# Patient Record
Sex: MALE | Race: BLACK OR AFRICAN AMERICAN | NOT HISPANIC OR LATINO | URBAN - METROPOLITAN AREA
[De-identification: names, ages, dates, MRNs, and addresses within clinical notes are randomized per-mention and may not be internally consistent; named-entity substitution may affect disease eponyms.]

---

## 2017-01-08 ENCOUNTER — EMERGENCY (EMERGENCY)
Facility: HOSPITAL | Age: 57
LOS: 1 days | Discharge: HOME | End: 2017-01-08
Attending: EMERGENCY MEDICINE
Payer: MEDICARE

## 2017-01-08 ENCOUNTER — INPATIENT (INPATIENT)
Facility: HOSPITAL | Age: 57
LOS: 3 days | Discharge: HOME | DRG: 312 | End: 2017-01-12
Attending: INTERNAL MEDICINE | Admitting: FAMILY MEDICINE
Payer: MEDICARE

## 2017-01-08 VITALS
SYSTOLIC BLOOD PRESSURE: 129 MMHG | HEART RATE: 81 BPM | RESPIRATION RATE: 18 BRPM | TEMPERATURE: 98 F | DIASTOLIC BLOOD PRESSURE: 72 MMHG | OXYGEN SATURATION: 98 %

## 2017-01-08 VITALS
HEIGHT: 70 IN | TEMPERATURE: 97 F | SYSTOLIC BLOOD PRESSURE: 142 MMHG | RESPIRATION RATE: 16 BRPM | DIASTOLIC BLOOD PRESSURE: 70 MMHG | OXYGEN SATURATION: 97 % | HEART RATE: 73 BPM | WEIGHT: 147.05 LBS

## 2017-01-08 VITALS
DIASTOLIC BLOOD PRESSURE: 62 MMHG | HEART RATE: 74 BPM | SYSTOLIC BLOOD PRESSURE: 97 MMHG | OXYGEN SATURATION: 96 % | RESPIRATION RATE: 20 BRPM | TEMPERATURE: 99 F

## 2017-01-08 PROCEDURE — 93010 ELECTROCARDIOGRAM REPORT: CPT

## 2017-01-08 PROCEDURE — 72100 X-RAY EXAM L-S SPINE 2/3 VWS: CPT | Mod: 26

## 2017-01-08 PROCEDURE — 73562 X-RAY EXAM OF KNEE 3: CPT | Mod: 26,RT

## 2017-01-08 PROCEDURE — 72170 X-RAY EXAM OF PELVIS: CPT | Mod: 26

## 2017-01-08 PROCEDURE — 99284 EMERGENCY DEPT VISIT MOD MDM: CPT

## 2017-01-08 RX ORDER — IBUPROFEN 200 MG
600 TABLET ORAL ONCE
Qty: 0 | Refills: 0 | Status: COMPLETED | OUTPATIENT
Start: 2017-01-08 | End: 2017-01-08

## 2017-01-08 RX ORDER — MORPHINE SULFATE 50 MG/1
4 CAPSULE, EXTENDED RELEASE ORAL ONCE
Qty: 0 | Refills: 0 | Status: DISCONTINUED | OUTPATIENT
Start: 2017-01-08 | End: 2017-01-08

## 2017-01-08 RX ADMIN — Medication 600 MILLIGRAM(S): at 16:15

## 2017-01-08 RX ADMIN — MORPHINE SULFATE 4 MILLIGRAM(S): 50 CAPSULE, EXTENDED RELEASE ORAL at 18:13

## 2017-01-08 RX ADMIN — Medication 600 MILLIGRAM(S): at 15:15

## 2017-01-08 RX ADMIN — MORPHINE SULFATE 4 MILLIGRAM(S): 50 CAPSULE, EXTENDED RELEASE ORAL at 21:41

## 2017-01-08 NOTE — ED ADULT NURSE NOTE - OBJECTIVE STATEMENT
pt A&OX3 into ED for c/o right knee pain and back pain s/p fall. states knees gave out and he fell back onto butt. Denies hitting head, denies LOC.

## 2017-01-08 NOTE — ED ADULT TRIAGE NOTE - CHIEF COMPLAINT QUOTE
BIBA< patient is awake and oriented times 3, complains of "my knees gave out and I fell, patient denies any head injury, denies any loc, complains of back and knee pain, last dialysis yesterday no complications

## 2017-01-08 NOTE — ED ADULT TRIAGE NOTE - CHIEF COMPLAINT QUOTE
pt biba states "feeling weak today." pt is a/3, reports he was DC'd earlier today from [facility] and felt like he was going to pass out. pt has fistula to right arm and receives dialysis on t/th/sat.
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
## 2017-01-08 NOTE — ED ADULT NURSE NOTE - PMH
Afib    Anuria    Aortic stenosis    Asthma  triggered with weather change or fluid overload  CAD (coronary artery disease)    Cirrhosis    COPD (chronic obstructive pulmonary disease)    Dialysis patient  Tuesday, Thursday, Saturday  Heart murmur    Hepatomegaly    HIV disease    Hyperlipemia    Hypertension    Hypertension    Kidney failure    Mitral regurgitation    Weight loss

## 2017-01-09 DIAGNOSIS — R55 SYNCOPE AND COLLAPSE: ICD-10-CM

## 2017-01-09 LAB
ALBUMIN SERPL ELPH-MCNC: 3.4 G/DL — SIGNIFICANT CHANGE UP (ref 3.3–5.2)
ALP SERPL-CCNC: 164 U/L — HIGH (ref 40–120)
ALT FLD-CCNC: 14 U/L — SIGNIFICANT CHANGE UP
ANION GAP SERPL CALC-SCNC: 16 MMOL/L — SIGNIFICANT CHANGE UP (ref 5–17)
ANION GAP SERPL CALC-SCNC: 19 MMOL/L — HIGH (ref 5–17)
AST SERPL-CCNC: 20 U/L — SIGNIFICANT CHANGE UP
BASOPHILS # BLD AUTO: 0 K/UL — SIGNIFICANT CHANGE UP (ref 0–0.2)
BASOPHILS NFR BLD AUTO: 0.3 % — SIGNIFICANT CHANGE UP (ref 0–2)
BILIRUB SERPL-MCNC: 0.5 MG/DL — SIGNIFICANT CHANGE UP (ref 0.4–2)
BUN SERPL-MCNC: 37 MG/DL — HIGH (ref 8–20)
BUN SERPL-MCNC: 48 MG/DL — HIGH (ref 8–20)
CALCIUM SERPL-MCNC: 9.4 MG/DL — SIGNIFICANT CHANGE UP (ref 8.6–10.2)
CALCIUM SERPL-MCNC: 9.7 MG/DL — SIGNIFICANT CHANGE UP (ref 8.6–10.2)
CHLORIDE SERPL-SCNC: 89 MMOL/L — LOW (ref 98–107)
CHLORIDE SERPL-SCNC: 92 MMOL/L — LOW (ref 98–107)
CK SERPL-CCNC: 75 U/L — SIGNIFICANT CHANGE UP (ref 30–200)
CO2 SERPL-SCNC: 26 MMOL/L — SIGNIFICANT CHANGE UP (ref 22–29)
CO2 SERPL-SCNC: 30 MMOL/L — HIGH (ref 22–29)
CREAT SERPL-MCNC: 5.42 MG/DL — HIGH (ref 0.5–1.3)
CREAT SERPL-MCNC: 6.41 MG/DL — HIGH (ref 0.5–1.3)
EOSINOPHIL # BLD AUTO: 0.4 K/UL — SIGNIFICANT CHANGE UP (ref 0–0.5)
EOSINOPHIL NFR BLD AUTO: 4 % — SIGNIFICANT CHANGE UP (ref 0–5)
GLUCOSE SERPL-MCNC: 103 MG/DL — SIGNIFICANT CHANGE UP (ref 70–115)
GLUCOSE SERPL-MCNC: 106 MG/DL — SIGNIFICANT CHANGE UP (ref 70–115)
HCT VFR BLD CALC: 44.8 % — SIGNIFICANT CHANGE UP (ref 42–52)
HGB BLD-MCNC: 14.4 G/DL — SIGNIFICANT CHANGE UP (ref 14–18)
LYMPHOCYTES # BLD AUTO: 1.6 K/UL — SIGNIFICANT CHANGE UP (ref 1–4.8)
LYMPHOCYTES # BLD AUTO: 18.6 % — LOW (ref 20–55)
MAGNESIUM SERPL-MCNC: 2.9 MG/DL — HIGH (ref 1.8–2.5)
MCHC RBC-ENTMCNC: 29.6 PG — SIGNIFICANT CHANGE UP (ref 27–31)
MCHC RBC-ENTMCNC: 32.1 G/DL — SIGNIFICANT CHANGE UP (ref 32–36)
MCV RBC AUTO: 92.2 FL — SIGNIFICANT CHANGE UP (ref 80–94)
MONOCYTES # BLD AUTO: 0.8 K/UL — SIGNIFICANT CHANGE UP (ref 0–0.8)
MONOCYTES NFR BLD AUTO: 8.7 % — SIGNIFICANT CHANGE UP (ref 3–10)
NEUTROPHILS # BLD AUTO: 6 K/UL — SIGNIFICANT CHANGE UP (ref 1.8–8)
NEUTROPHILS NFR BLD AUTO: 68.1 % — SIGNIFICANT CHANGE UP (ref 37–73)
PLATELET # BLD AUTO: 235 K/UL — SIGNIFICANT CHANGE UP (ref 150–400)
POTASSIUM SERPL-MCNC: 4.4 MMOL/L — SIGNIFICANT CHANGE UP (ref 3.5–5.3)
POTASSIUM SERPL-MCNC: 5 MMOL/L — SIGNIFICANT CHANGE UP (ref 3.5–5.3)
POTASSIUM SERPL-SCNC: 4.4 MMOL/L — SIGNIFICANT CHANGE UP (ref 3.5–5.3)
POTASSIUM SERPL-SCNC: 5 MMOL/L — SIGNIFICANT CHANGE UP (ref 3.5–5.3)
PROT SERPL-MCNC: 7.9 G/DL — SIGNIFICANT CHANGE UP (ref 6.6–8.7)
RBC # BLD: 4.86 M/UL — SIGNIFICANT CHANGE UP (ref 4.6–6.2)
RBC # FLD: 15.9 % — HIGH (ref 11–15.6)
SODIUM SERPL-SCNC: 134 MMOL/L — LOW (ref 135–145)
SODIUM SERPL-SCNC: 138 MMOL/L — SIGNIFICANT CHANGE UP (ref 135–145)
TROPONIN T SERPL-MCNC: 0.81 NG/ML — CRITICAL HIGH (ref 0–0.06)
TROPONIN T SERPL-MCNC: 0.86 NG/ML — CRITICAL HIGH (ref 0–0.06)
WBC # BLD: 8.76 K/UL — SIGNIFICANT CHANGE UP (ref 4.8–10.8)
WBC # FLD AUTO: 8.76 K/UL — SIGNIFICANT CHANGE UP (ref 4.8–10.8)

## 2017-01-09 PROCEDURE — 99285 EMERGENCY DEPT VISIT HI MDM: CPT

## 2017-01-09 PROCEDURE — 70450 CT HEAD/BRAIN W/O DYE: CPT | Mod: 26

## 2017-01-09 PROCEDURE — 99232 SBSQ HOSP IP/OBS MODERATE 35: CPT

## 2017-01-09 PROCEDURE — 71010: CPT | Mod: 26

## 2017-01-09 PROCEDURE — 93010 ELECTROCARDIOGRAM REPORT: CPT

## 2017-01-09 RX ORDER — FOLIC ACID 0.8 MG
1 TABLET ORAL DAILY
Qty: 0 | Refills: 0 | Status: DISCONTINUED | OUTPATIENT
Start: 2017-01-09 | End: 2017-01-12

## 2017-01-09 RX ORDER — SEVELAMER CARBONATE 2400 MG/1
800 POWDER, FOR SUSPENSION ORAL
Qty: 0 | Refills: 0 | Status: DISCONTINUED | OUTPATIENT
Start: 2017-01-09 | End: 2017-01-12

## 2017-01-09 RX ORDER — ALBUTEROL 90 UG/1
2.5 AEROSOL, METERED ORAL EVERY 6 HOURS
Qty: 0 | Refills: 0 | Status: DISCONTINUED | OUTPATIENT
Start: 2017-01-09 | End: 2017-01-12

## 2017-01-09 RX ORDER — DOCUSATE SODIUM 100 MG
100 CAPSULE ORAL THREE TIMES A DAY
Qty: 0 | Refills: 0 | Status: DISCONTINUED | OUTPATIENT
Start: 2017-01-09 | End: 2017-01-12

## 2017-01-09 RX ORDER — ATORVASTATIN CALCIUM 80 MG/1
10 TABLET, FILM COATED ORAL AT BEDTIME
Qty: 0 | Refills: 0 | Status: DISCONTINUED | OUTPATIENT
Start: 2017-01-09 | End: 2017-01-12

## 2017-01-09 RX ORDER — HYDROMORPHONE HYDROCHLORIDE 2 MG/ML
2 INJECTION INTRAMUSCULAR; INTRAVENOUS; SUBCUTANEOUS EVERY 4 HOURS
Qty: 0 | Refills: 0 | Status: DISCONTINUED | OUTPATIENT
Start: 2017-01-09 | End: 2017-01-12

## 2017-01-09 RX ORDER — ALBUTEROL 90 UG/1
1 AEROSOL, METERED ORAL EVERY 4 HOURS
Qty: 0 | Refills: 0 | Status: DISCONTINUED | OUTPATIENT
Start: 2017-01-09 | End: 2017-01-12

## 2017-01-09 RX ORDER — BUDESONIDE AND FORMOTEROL FUMARATE DIHYDRATE 160; 4.5 UG/1; UG/1
2 AEROSOL RESPIRATORY (INHALATION)
Qty: 0 | Refills: 0 | Status: DISCONTINUED | OUTPATIENT
Start: 2017-01-09 | End: 2017-01-12

## 2017-01-09 RX ORDER — METOPROLOL TARTRATE 50 MG
100 TABLET ORAL
Qty: 0 | Refills: 0 | Status: DISCONTINUED | OUTPATIENT
Start: 2017-01-09 | End: 2017-01-12

## 2017-01-09 RX ORDER — POLYETHYLENE GLYCOL 3350 17 G/17G
17 POWDER, FOR SOLUTION ORAL DAILY
Qty: 0 | Refills: 0 | Status: DISCONTINUED | OUTPATIENT
Start: 2017-01-09 | End: 2017-01-12

## 2017-01-09 RX ORDER — OXYCODONE HYDROCHLORIDE 5 MG/1
5 TABLET ORAL EVERY 4 HOURS
Qty: 0 | Refills: 0 | Status: DISCONTINUED | OUTPATIENT
Start: 2017-01-09 | End: 2017-01-12

## 2017-01-09 RX ORDER — HEPARIN SODIUM 5000 [USP'U]/ML
7500 INJECTION INTRAVENOUS; SUBCUTANEOUS EVERY 8 HOURS
Qty: 0 | Refills: 0 | Status: DISCONTINUED | OUTPATIENT
Start: 2017-01-09 | End: 2017-01-12

## 2017-01-09 RX ORDER — CLOPIDOGREL BISULFATE 75 MG/1
75 TABLET, FILM COATED ORAL DAILY
Qty: 0 | Refills: 0 | Status: DISCONTINUED | OUTPATIENT
Start: 2017-01-09 | End: 2017-01-12

## 2017-01-09 RX ORDER — ASPIRIN/CALCIUM CARB/MAGNESIUM 324 MG
81 TABLET ORAL DAILY
Qty: 0 | Refills: 0 | Status: DISCONTINUED | OUTPATIENT
Start: 2017-01-09 | End: 2017-01-12

## 2017-01-09 RX ORDER — NIFEDIPINE 30 MG
90 TABLET, EXTENDED RELEASE 24 HR ORAL DAILY
Qty: 0 | Refills: 0 | Status: DISCONTINUED | OUTPATIENT
Start: 2017-01-09 | End: 2017-01-09

## 2017-01-09 RX ADMIN — Medication 100 MILLIGRAM(S): at 18:05

## 2017-01-09 RX ADMIN — Medication 81 MILLIGRAM(S): at 12:05

## 2017-01-09 RX ADMIN — OXYCODONE HYDROCHLORIDE 5 MILLIGRAM(S): 5 TABLET ORAL at 13:10

## 2017-01-09 RX ADMIN — ATORVASTATIN CALCIUM 10 MILLIGRAM(S): 80 TABLET, FILM COATED ORAL at 22:05

## 2017-01-09 RX ADMIN — ALBUTEROL 2.5 MILLIGRAM(S): 90 AEROSOL, METERED ORAL at 21:05

## 2017-01-09 RX ADMIN — HEPARIN SODIUM 7500 UNIT(S): 5000 INJECTION INTRAVENOUS; SUBCUTANEOUS at 18:05

## 2017-01-09 RX ADMIN — SEVELAMER CARBONATE 800 MILLIGRAM(S): 2400 POWDER, FOR SUSPENSION ORAL at 18:20

## 2017-01-09 RX ADMIN — CLOPIDOGREL BISULFATE 75 MILLIGRAM(S): 75 TABLET, FILM COATED ORAL at 12:05

## 2017-01-09 RX ADMIN — HYDROMORPHONE HYDROCHLORIDE 2 MILLIGRAM(S): 2 INJECTION INTRAMUSCULAR; INTRAVENOUS; SUBCUTANEOUS at 18:21

## 2017-01-09 RX ADMIN — SEVELAMER CARBONATE 800 MILLIGRAM(S): 2400 POWDER, FOR SUSPENSION ORAL at 12:35

## 2017-01-09 RX ADMIN — POLYETHYLENE GLYCOL 3350 17 GRAM(S): 17 POWDER, FOR SOLUTION ORAL at 18:06

## 2017-01-09 RX ADMIN — ALBUTEROL 2.5 MILLIGRAM(S): 90 AEROSOL, METERED ORAL at 15:14

## 2017-01-09 RX ADMIN — Medication 100 MILLIGRAM(S): at 18:06

## 2017-01-09 RX ADMIN — Medication 100 MILLIGRAM(S): at 22:05

## 2017-01-09 RX ADMIN — OXYCODONE HYDROCHLORIDE 5 MILLIGRAM(S): 5 TABLET ORAL at 12:05

## 2017-01-09 RX ADMIN — Medication 90 MILLIGRAM(S): at 12:35

## 2017-01-09 RX ADMIN — HYDROMORPHONE HYDROCHLORIDE 2 MILLIGRAM(S): 2 INJECTION INTRAMUSCULAR; INTRAVENOUS; SUBCUTANEOUS at 19:19

## 2017-01-09 RX ADMIN — HEPARIN SODIUM 7500 UNIT(S): 5000 INJECTION INTRAVENOUS; SUBCUTANEOUS at 22:05

## 2017-01-09 RX ADMIN — Medication 1 MILLIGRAM(S): at 12:05
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
## 2017-01-09 NOTE — ED ADULT NURSE NOTE - OBJECTIVE STATEMENT
Pt family states pt "was sitting on the edge of the bed, complained of nausea, fell backward on bed, eyes rolled to back of head and he was unresponsive for about a minute, upon arousal with sternal rub pt vomited", family denies apnea. Pt was in hospital yesterday for a fall caused by weakness resulting in right knee pain.

## 2017-01-09 NOTE — H&P ADULT. - FAMILY HISTORY
<<-----Click on this checkbox to enter Family History Family history of diabetes mellitus     Father  Still living? Unknown  Family history of coronary arteriosclerosis, Age at diagnosis: Age Unknown
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
## 2017-01-09 NOTE — H&P ADULT. - ASSESSMENT
55 y/o male with a hx of afib, Anuria, Aortic stenosis, Asthma, CAD, Cirrhosis, COPD, ESRD on Dialysis (T/T/S), HIV, HDL and HTN. Presented to ED status post syncopal episode. On EMS arrival vitals were: HR 66-78, BP /56-74, RR 18. And pt was c/o generalized weakness. found to have elevated Troponin without chest pain.   Syncope, suspect bradyarrhythmias   - Admit to telemetry unit  - Consulted [facility] svc notified  - f/u CE x2, first set elevated, questionable chronically elevated on review of prior labs  - OOB with assistance, fall and aspiration precautions  - f/u Carotid doppler and repeat ECHO for monitoring of AS, mod-severe mitral annular calcification  - resume home medications for HIV, ESRD, HTN, HLD, Asthma, CAD and COPD  - consulted Nephrology for continuation of hemodialysis   - DVT prophylaxis

## 2017-01-09 NOTE — ED PROVIDER NOTE - OBJECTIVE STATEMENT
57 y/o male with a hx of 55 y/o male with a hx of afib, Anuria, Aortic stenosis, Asthma, CAD, Cirrhosis, COPD, Dialysis, Heart murmur, HIV, HDL, HTN, renal failure, and back surgery presents to the ED c/o weakness that onset today. pt was here earlier s/p fall and hitting his knee. Daughter at bedside notes that pt was at rest, exerted himself little and pt's eye rolled back and pt started to become unresponsive. Sx lasted about 1 minute. Daughter called 911 who sent pt here. Denies numbness, tingling, fever, change in vision/speech/gait, or vomiting. No further complaints at this time.

## 2017-01-09 NOTE — CONSULT NOTE ADULT - ASSESSMENT
ESRD on HD TTS tolerating ok will HD again hunter  is admitted for near syncope workup  Consent obtained.  BP volume status electrolytes acceptible

## 2017-01-09 NOTE — ED PROVIDER NOTE - CRITICAL CARE PROVIDED
documentation/consultation with other physicians/direct patient care (not related to procedure)/consult w/ pt's family directly relating to pts condition/interpretation of diagnostic studies

## 2017-01-09 NOTE — ED ADULT NURSE NOTE - CHIEF COMPLAINT QUOTE
pt biba states "feeling weak today." pt is a/3, reports he was DC'd earlier today from [facility] and felt like he was going to pass out. pt has fistula to right arm and receives dialysis on t/th/sat.

## 2017-01-09 NOTE — CONSULT NOTE ADULT - ASSESSMENT
ESRD on HD on a TTS schedule   Has been tolerating ok next HSD [facility]  Consent obtained in ED  admitted for syncope workup

## 2017-01-09 NOTE — H&P ADULT. - HISTORY OF PRESENT ILLNESS
This is a 57 y/o male with a hx of atrial fibrillation (not on AC), Anuria, Aortic stenosis, Asthma, CAD, Cirrhosis, COPD, ESRD on Dialysis (T/T/S), HIV, Hepatitis C, HDL, HTN and back surgery presents to the ED stating that while sitting on his bed he "blacked out" upon awakening he felt dizziness with lasted until he arrived to the ER. pt was here earlier s/p fall and hitting his knee. Currently no family at bedside, but as per documentation in ER his daughter indicated that patient had exerted himself a little, eye's rolled back and he became unresponsive for one minute. patient denies any chest pain, palpitations, nausea, vomiting, diarrhea, headache, visual changes.     Multiple similar episodes prior secondary to hypotension.

## 2017-01-09 NOTE — PROGRESS NOTE ADULT - SUBJECTIVE AND OBJECTIVE BOX
ROSALVA RODRIGUES is a 56y Male with HPI:  This is a 57 y/o male with a hx of atrial fibrillation (not on AC), Anuria, Aortic stenosis, Asthma, CAD, Cirrhosis, COPD, ESRD on Dialysis (T/T/S), HIV, Hepatitis C, HDL, HTN and back surgery presents to the ED stating that while sitting on his bed he "blacked out" upon awakening he felt dizziness with lasted until he arrived to the ER. pt was here earlier s/p fall and hitting his knee.)        Allergies:  No Known Drug Allergies  shellfish (Hives)      Medications:  aspirin enteric coated 81milliGRAM(s) Oral daily  oxyCODONE IR 5milliGRAM(s) Oral every 4 hours PRN  atorvastatin 10milliGRAM(s) Oral at bedtime  clopidogrel Tablet 75milliGRAM(s) Oral daily  zidovudine 300 mG/lamiVUDine 150 mG 1Tablet(s) Oral two times a day  metoprolol 100milliGRAM(s) Oral two times a day  ALBUTerol    0.083% 2.5milliGRAM(s) Nebulizer every 6 hours  ALBUTerol    90 MICROgram(s) HFA Inhaler 1Puff(s) Inhalation every 4 hours  buDESOnide 160 MICROgram(s)/formoterol 4.5 MICROgram(s) Inhaler 2Puff(s) Inhalation two times a day  NIFEdipine XL 90milliGRAM(s) Oral daily  docusate sodium 100milliGRAM(s) Oral three times a day  polyethylene glycol 3350 17Gram(s) Oral daily  sevelamer hydrochloride 800milliGRAM(s) Oral three times a day with meals  folic acid 1milliGRAM(s) Oral daily  heparin  Injectable 7500Unit(s) SubCutaneous every 8 hours      ANTIBIOTICS:         Review of Systems: - Negative except as mentioned above     Physical Exam:  ICU Vital Signs Last 24 Hrs  T(C): 37.4, Max: 37.4 (01-09 @ 06:15)  T(F): 99.4, Max: 99.4 (01-09 @ 06:15)  HR: 83 (74 - 86)  BP: 122/56 (97/62 - 124/59)  BP(mean): --  ABP: --  ABP(mean): --  RR: 20 (20 - 20)  SpO2: 98% (96% - 99%)    GEN: NAD, pleasant  HEENT: normocephalic and atraumatic. EOMI. PRESTON...  NECK: Supple. No carotid bruits.  No lymphadenopathy or thyromegaly.  LUNGS: Clear to auscultation.  HEART: Regular rate and rhythm without murmur.  ABDOMEN: Soft, nontender, and nondistended.  Positive bowel sounds.  No hepatosplenomegaly was noted.  NO REBOUND NO GUARDING  EXTREMITIES: Without any cyanosis, clubbing, rash, lesions or edema.  NEUROLOGIC: Cranial nerves II through XII are grossly intact.    SKIN: No ulceration or induration present.      Labs:  09 Jan 2017 01:23    138    |  92     |  37.0   ----------------------------<  103    4.4     |  30.0   |  5.42     Ca    9.7        09 Jan 2017 01:23    TPro  7.9    /  Alb  3.4    /  TBili  0.5    /  DBili  x      /  AST  20     /  ALT  14     /  AlkPhos  164    09 Jan 2017 01:23                          14.4   8.76  )-----------( 235      ( 09 Jan 2017 01:23 )             44.8           LIVER FUNCTIONS - ( 09 Jan 2017 01:23 )  Alb: 3.4 g/dL / Pro: 7.9 g/dL / ALK PHOS: 164 U/L / ALT: 14 U/L / AST: 20 U/L / GGT: x           CARDIAC MARKERS ( 09 Jan 2017 07:40 )  x     / 0.86 ng/mL / 75 U/L / x     / x      CARDIAC MARKERS ( 09 Jan 2017 01:23 )  x     / 0.81 ng/mL / x     / x     / x          CAPILLARY BLOOD GLUCOSE        RECENT CULTURES:

## 2017-01-09 NOTE — CONSULT NOTE ADULT - ASSESSMENT
56y Male with prior history of CAD with PCI in 2012, old MI in 2012, ESRD on HD, AF not on anticoagulation, HTN 56y Male with prior history of CAD with PCI in 2012, old MI in 2012, ESRD on HD, AF not on anticoagulation, HTN s/p fall/syncope. Recent fall with fractured ribs in May 2016.     1. No events on tele so far but pt with IVCD and AF with 2 falls/syncope.  2. Minimal troponin elevation likely secondary to ESRD and CAD. Recent PET without ischemia  3. EP evaluation for syncope/fall for ILR/PPM   4. HD as per renal.  5. Continue telemetry monitoring.

## 2017-01-09 NOTE — H&P ADULT. - RS GEN PE MLT RESP DETAILS PC
respirations non-labored/clear to auscultation bilaterally/no wheezes/diminished breath sounds, L/no intercostal retractions/no rhonchi/no subcutaneous emphysema/no rales/no chest wall tenderness/diminished breath sounds, R

## 2017-01-09 NOTE — ED PROVIDER NOTE - NS ED MD SCRIBE ATTENDING SCRIBE SECTIONS
REVIEW OF SYSTEMS/HIV/RESULTS/VITAL SIGNS( Pullset)/PHYSICAL EXAM/DISPOSITION/PROGRESS NOTE/HISTORY OF PRESENT ILLNESS/PAST MEDICAL/SURGICAL/SOCIAL HISTORY/CONSULTATIONS/SHIFT CHANGE
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
## 2017-01-09 NOTE — H&P ADULT. - GASTROINTESTINAL DETAILS
no guarding/nontender/no bruit/no rebound tenderness/no organomegaly/soft/no distention/normal/bowel sounds normal/no rigidity/no masses palpable

## 2017-01-10 PROCEDURE — 93308 TTE F-UP OR LMTD: CPT | Mod: 26,59

## 2017-01-10 PROCEDURE — 93306 TTE W/DOPPLER COMPLETE: CPT | Mod: 26

## 2017-01-10 PROCEDURE — 93880 EXTRACRANIAL BILAT STUDY: CPT | Mod: 26

## 2017-01-10 PROCEDURE — 99232 SBSQ HOSP IP/OBS MODERATE 35: CPT

## 2017-01-10 RX ORDER — SENNA PLUS 8.6 MG/1
2 TABLET ORAL AT BEDTIME
Qty: 0 | Refills: 0 | Status: DISCONTINUED | OUTPATIENT
Start: 2017-01-10 | End: 2017-01-12

## 2017-01-10 RX ORDER — IPRATROPIUM/ALBUTEROL SULFATE 18-103MCG
3 AEROSOL WITH ADAPTER (GRAM) INHALATION ONCE
Qty: 0 | Refills: 0 | Status: COMPLETED | OUTPATIENT
Start: 2017-01-10 | End: 2017-01-10

## 2017-01-10 RX ORDER — CEPHALEXIN 500 MG
500 CAPSULE ORAL ONCE
Qty: 0 | Refills: 0 | Status: COMPLETED | OUTPATIENT
Start: 2017-01-10 | End: 2017-01-10

## 2017-01-10 RX ADMIN — ALBUTEROL 2.5 MILLIGRAM(S): 90 AEROSOL, METERED ORAL at 02:54

## 2017-01-10 RX ADMIN — ALBUTEROL 2.5 MILLIGRAM(S): 90 AEROSOL, METERED ORAL at 14:32

## 2017-01-10 RX ADMIN — Medication 81 MILLIGRAM(S): at 12:44

## 2017-01-10 RX ADMIN — HYDROMORPHONE HYDROCHLORIDE 2 MILLIGRAM(S): 2 INJECTION INTRAMUSCULAR; INTRAVENOUS; SUBCUTANEOUS at 08:44

## 2017-01-10 RX ADMIN — SEVELAMER CARBONATE 800 MILLIGRAM(S): 2400 POWDER, FOR SUSPENSION ORAL at 08:38

## 2017-01-10 RX ADMIN — Medication 100 MILLIGRAM(S): at 05:14

## 2017-01-10 RX ADMIN — BUDESONIDE AND FORMOTEROL FUMARATE DIHYDRATE 2 PUFF(S): 160; 4.5 AEROSOL RESPIRATORY (INHALATION) at 20:16

## 2017-01-10 RX ADMIN — Medication 100 MILLIGRAM(S): at 19:00

## 2017-01-10 RX ADMIN — Medication 1 MILLIGRAM(S): at 12:45

## 2017-01-10 RX ADMIN — SEVELAMER CARBONATE 800 MILLIGRAM(S): 2400 POWDER, FOR SUSPENSION ORAL at 12:44

## 2017-01-10 RX ADMIN — HYDROMORPHONE HYDROCHLORIDE 2 MILLIGRAM(S): 2 INJECTION INTRAMUSCULAR; INTRAVENOUS; SUBCUTANEOUS at 23:25

## 2017-01-10 RX ADMIN — HYDROMORPHONE HYDROCHLORIDE 2 MILLIGRAM(S): 2 INJECTION INTRAMUSCULAR; INTRAVENOUS; SUBCUTANEOUS at 04:09

## 2017-01-10 RX ADMIN — Medication 3 MILLILITER(S): at 17:20

## 2017-01-10 RX ADMIN — Medication 100 MILLIGRAM(S): at 13:28

## 2017-01-10 RX ADMIN — HEPARIN SODIUM 7500 UNIT(S): 5000 INJECTION INTRAVENOUS; SUBCUTANEOUS at 13:28

## 2017-01-10 RX ADMIN — HYDROMORPHONE HYDROCHLORIDE 2 MILLIGRAM(S): 2 INJECTION INTRAMUSCULAR; INTRAVENOUS; SUBCUTANEOUS at 05:20

## 2017-01-10 RX ADMIN — HYDROMORPHONE HYDROCHLORIDE 2 MILLIGRAM(S): 2 INJECTION INTRAMUSCULAR; INTRAVENOUS; SUBCUTANEOUS at 09:38

## 2017-01-10 RX ADMIN — CLOPIDOGREL BISULFATE 75 MILLIGRAM(S): 75 TABLET, FILM COATED ORAL at 12:44

## 2017-01-10 RX ADMIN — ALBUTEROL 2.5 MILLIGRAM(S): 90 AEROSOL, METERED ORAL at 08:03

## 2017-01-10 RX ADMIN — POLYETHYLENE GLYCOL 3350 17 GRAM(S): 17 POWDER, FOR SOLUTION ORAL at 12:45

## 2017-01-10 RX ADMIN — BUDESONIDE AND FORMOTEROL FUMARATE DIHYDRATE 2 PUFF(S): 160; 4.5 AEROSOL RESPIRATORY (INHALATION) at 08:41

## 2017-01-10 RX ADMIN — SEVELAMER CARBONATE 800 MILLIGRAM(S): 2400 POWDER, FOR SUSPENSION ORAL at 19:00

## 2017-01-10 RX ADMIN — HYDROMORPHONE HYDROCHLORIDE 2 MILLIGRAM(S): 2 INJECTION INTRAMUSCULAR; INTRAVENOUS; SUBCUTANEOUS at 22:23

## 2017-01-10 RX ADMIN — Medication 500 MILLIGRAM(S): at 17:05

## 2017-01-10 RX ADMIN — ALBUTEROL 2.5 MILLIGRAM(S): 90 AEROSOL, METERED ORAL at 20:16

## 2017-01-10 RX ADMIN — HEPARIN SODIUM 7500 UNIT(S): 5000 INJECTION INTRAVENOUS; SUBCUTANEOUS at 05:14

## 2017-01-10 NOTE — PROGRESS NOTE ADULT - SUBJECTIVE AND OBJECTIVE BOX
Chief Complaint:  Cough, pink frothy sputum      Assessment:  Denies fever, chills, n/v/d chest pain, sob,  + constipation and feeling full,  Denies BM today, last BM 1 day ago.  A and o x 3, pleasant and cooperative, making needs known, skin warm and dry, bilateral fine crackles mid lobes, negative egophony, no edema, bedside cup with frothy pink sputum noted, abd soft, slight distended, bs + 4.      Plan:  CHF:  dialysis pending tonight, continue current plan of care.   Stat neb tx given with some relief.   Robitussin prn  Constipation: add senna, continue colace and Miralax

## 2017-01-10 NOTE — PROGRESS NOTE ADULT - SUBJECTIVE AND OBJECTIVE BOX
ROSALVA RODRIGUES is a 56y Male with HPI:  This is a 57 y/o male with a hx of atrial fibrillation (not on AC), Anuria, Aortic stenosis, Asthma, CAD, Cirrhosis, COPD, ESRD on Dialysis (T/T/S), HIV, Hepatitis C, HDL, HTN and back surgery presents to the ED stating that while sitting on his bed he "blacked out" upon awakening he felt dizziness with lasted until he arrived to the ER. pt was here earlier s/p fall and hitting his knee. Currently no family at bedside, but as per documentation in ER his daughter indicated that patient had exerted himself a little, eye's rolled back and he became unresponsive for one minute. patient denies any chest pain, palpitations, nausea, vomiting, diarrhea, headache, visual changes.   UNCLEAR ETIOLOGY POSSIBLE VASO VAGAL EP NOTE APPRECIATED FOR LOOP RECORDER  PT WITH  PRONOUNCED TREMORS TODAY    Multiple similar episodes prior secondary to hypotension. (09 Jan 2017 06:46)        Allergies:  No Known Drug Allergies  shellfish (Hives)      Medications:  aspirin enteric coated 81milliGRAM(s) Oral daily  oxyCODONE IR 5milliGRAM(s) Oral every 4 hours PRN  atorvastatin 10milliGRAM(s) Oral at bedtime  clopidogrel Tablet 75milliGRAM(s) Oral daily  zidovudine 300 mG/lamiVUDine 150 mG 1Tablet(s) Oral two times a day  metoprolol 100milliGRAM(s) Oral two times a day  ALBUTerol    0.083% 2.5milliGRAM(s) Nebulizer every 6 hours  ALBUTerol    90 MICROgram(s) HFA Inhaler 1Puff(s) Inhalation every 4 hours  buDESOnide 160 MICROgram(s)/formoterol 4.5 MICROgram(s) Inhaler 2Puff(s) Inhalation two times a day  docusate sodium 100milliGRAM(s) Oral three times a day  polyethylene glycol 3350 17Gram(s) Oral daily  sevelamer hydrochloride 800milliGRAM(s) Oral three times a day with meals  folic acid 1milliGRAM(s) Oral daily  heparin  Injectable 7500Unit(s) SubCutaneous every 8 hours  HYDROmorphone   Tablet 2milliGRAM(s) Oral every 4 hours PRN      ANTIBIOTICS:NONE         Review of Systems: - Negative except as mentioned above     Physical Exam:  ICU Vital Signs Last 24 Hrs  T(C): 36.6, Max: 37.2 (01-09 @ 11:45)  T(F): 97.9, Max: 98.9 (01-09 @ 11:45)  HR: 106 (85 - 106)  BP: 128/60 (128/60 - 152/82)  BP(mean): --  ABP: --  ABP(mean): --  RR: 18 (18 - 19)  SpO2: 99% (95% - 99%)    GEN: NAD, pleasant  HEENT: normocephalic and atraumatic. EOMI. PRESTON...  NECK: Supple. No carotid bruits.  No lymphadenopathy or thyromegaly.  LUNGS: Clear to auscultation.  HEART: Regular rate and rhythm without murmur.  ABDOMEN: Soft, nontender, and nondistended.  Positive bowel sounds.  No hepatosplenomegaly was noted.  NO REBOUND NO GUARDING  EXTREMITIES: Without any cyanosis, clubbing, rash, lesions or edema.  NEUROLOGIC: Cranial nerves II through XII are grossly intact.INVOLUNTARY TREMORS AT REST    SKIN: No ulceration or induration present.      Labs:  09 Jan 2017 23:21    134    |  89     |  48.0   ----------------------------<  106    5.0     |  26.0   |  6.41     Ca    9.4        09 Jan 2017 23:21  Mg     2.9       09 Jan 2017 23:21    TPro  7.9    /  Alb  3.4    /  TBili  0.5    /  DBili  x      /  AST  20     /  ALT  14     /  AlkPhos  164    09 Jan 2017 01:23                          14.4   8.76  )-----------( 235      ( 09 Jan 2017 01:23 )             44.8           LIVER FUNCTIONS - ( 09 Jan 2017 01:23 )  Alb: 3.4 g/dL / Pro: 7.9 g/dL / ALK PHOS: 164 U/L / ALT: 14 U/L / AST: 20 U/L / GGT: x           CARDIAC MARKERS ( 09 Jan 2017 07:40 )  x     / 0.86 ng/mL / 75 U/L / x     / x      CARDIAC MARKERS ( 09 Jan 2017 01:23 )  x     / 0.81 ng/mL / x     / x     / x          CAPILLARY BLOOD GLUCOSE        RECENT CULTURES:

## 2017-01-10 NOTE — PROGRESS NOTE ADULT - SUBJECTIVE AND OBJECTIVE BOX
Chief Complaint:  Coughing up pink tinged sputum and feeling full      Assessment:  Denies fever, chills, n/v/d chest pain, sob, abd pain, leg pain, swelling, or headaches.  + for constipation x 24 hours and feeling full.  Also + for cough with yellow sputum and frothy pink serosanguinous fluid.    Patient is A and O x 3, skin warm and dry, affect appropriate pleasant, tang x 4, ambulatory, bilateral fine crackles at bases, rr 16 regular for rate and rhythm color pink, abd soft, bs + 4, slight distended, no edema     Plan: constipation  Add senna, continue colace and mirlax, with no BM notify MD  Cough/CHF, pulmonary edema, dialysis's pending tonight-confimed with , Robitussin and stat tx given with slight relief. Discussed with primary and message left with Nephrology and dialysis.  Continue pulse oxy and continue to monitor and notify MD with de sating or increased coughing with pink and frothy sputum.

## 2017-01-10 NOTE — CONSULT NOTE ADULT - ASSESSMENT
The patient is a 56y Male with multiple medical issues now status post syncope.   He has some tremor which may be metabolic.     I will add EEG to his work up.    We will continue to follow.
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
## 2017-01-10 NOTE — PROGRESS NOTE ADULT - SUBJECTIVE AND OBJECTIVE BOX
Pt s/p uncomplicated loop recorder implantation. See dictated report for details.  Remove opsite dressing in AM, leave steristrips intact.  OK to shower tomorrow.  Will arrange outpt wound check 7-10 days at [facility].     Shaun Langley MD

## 2017-01-10 NOTE — PROGRESS NOTE ADULT - SUBJECTIVE AND OBJECTIVE BOX
NEPHROLOGY INTERVAL HPI/OVERNIGHT EVENTS:    Examined earlier   Looks comfortable   No complaints    MEDICATIONS  (STANDING):    MEDICATIONS  (PRN):      Allergies    No Known Drug Allergies  shellfish (Hives)    Intolerances        Vital Signs Last 24 Hrs  T(C): --  T(F): --  HR: --  BP: --  BP(mean): --  RR: --  SpO2: --  Daily     Daily     PHYSICAL EXAM:  GENERAL: appears chronically ill  HEAD:  Atraumatic, Normocephalic  EYES: EOMI  NECK: Supple, neck  veins full  NERVOUS SYSTEM:  Alert & Oriented X3  CHEST/LUNG: CTS B/L   HEART: RRR +S1S2  ABDOMEN: Soft ND NT +BS  EXTREMITIES:  trace leg edema          LABS:                        14.4   8.76  )-----------( 235      ( 09 Jan 2017 01:23 )             44.8     09 Jan 2017 23:21    134    |  89     |  48.0   ----------------------------<  106    5.0     |  26.0   |  6.41     Ca    9.4        09 Jan 2017 23:21  Mg     2.9       09 Jan 2017 23:21    TPro  7.9    /  Alb  3.4    /  TBili  0.5    /  DBili  x      /  AST  20     /  ALT  14     /  AlkPhos  164    09 Jan 2017 01:23        Magnesium, Serum: 2.9 mg/dL (01-09 @ 23:21)          RADIOLOGY & ADDITIONAL TESTS:

## 2017-01-11 ENCOUNTER — TRANSCRIPTION ENCOUNTER (OUTPATIENT)
Age: 57
End: 2017-01-11

## 2017-01-11 DIAGNOSIS — N18.6 END STAGE RENAL DISEASE: ICD-10-CM

## 2017-01-11 DIAGNOSIS — N25.0 RENAL OSTEODYSTROPHY: ICD-10-CM

## 2017-01-11 PROCEDURE — 95819 EEG AWAKE AND ASLEEP: CPT | Mod: 26

## 2017-01-11 PROCEDURE — 99232 SBSQ HOSP IP/OBS MODERATE 35: CPT

## 2017-01-11 RX ADMIN — ATORVASTATIN CALCIUM 10 MILLIGRAM(S): 80 TABLET, FILM COATED ORAL at 21:45

## 2017-01-11 RX ADMIN — Medication 81 MILLIGRAM(S): at 12:34

## 2017-01-11 RX ADMIN — Medication 100 MILLIGRAM(S): at 21:45

## 2017-01-11 RX ADMIN — Medication 100 MILLIGRAM(S): at 05:54

## 2017-01-11 RX ADMIN — Medication 200 MILLIGRAM(S): at 21:46

## 2017-01-11 RX ADMIN — SENNA PLUS 2 TABLET(S): 8.6 TABLET ORAL at 01:30

## 2017-01-11 RX ADMIN — ATORVASTATIN CALCIUM 10 MILLIGRAM(S): 80 TABLET, FILM COATED ORAL at 01:27

## 2017-01-11 RX ADMIN — HYDROMORPHONE HYDROCHLORIDE 2 MILLIGRAM(S): 2 INJECTION INTRAMUSCULAR; INTRAVENOUS; SUBCUTANEOUS at 05:58

## 2017-01-11 RX ADMIN — Medication 100 MILLIGRAM(S): at 01:28

## 2017-01-11 RX ADMIN — ALBUTEROL 2.5 MILLIGRAM(S): 90 AEROSOL, METERED ORAL at 03:30

## 2017-01-11 RX ADMIN — Medication 100 MILLIGRAM(S): at 12:35

## 2017-01-11 RX ADMIN — ALBUTEROL 2.5 MILLIGRAM(S): 90 AEROSOL, METERED ORAL at 09:10

## 2017-01-11 RX ADMIN — CLOPIDOGREL BISULFATE 75 MILLIGRAM(S): 75 TABLET, FILM COATED ORAL at 12:34

## 2017-01-11 RX ADMIN — BUDESONIDE AND FORMOTEROL FUMARATE DIHYDRATE 2 PUFF(S): 160; 4.5 AEROSOL RESPIRATORY (INHALATION) at 09:10

## 2017-01-11 RX ADMIN — Medication 100 MILLIGRAM(S): at 18:39

## 2017-01-11 RX ADMIN — HYDROMORPHONE HYDROCHLORIDE 2 MILLIGRAM(S): 2 INJECTION INTRAMUSCULAR; INTRAVENOUS; SUBCUTANEOUS at 18:29

## 2017-01-11 RX ADMIN — POLYETHYLENE GLYCOL 3350 17 GRAM(S): 17 POWDER, FOR SOLUTION ORAL at 12:34

## 2017-01-11 RX ADMIN — ALBUTEROL 2.5 MILLIGRAM(S): 90 AEROSOL, METERED ORAL at 14:39

## 2017-01-11 RX ADMIN — SEVELAMER CARBONATE 800 MILLIGRAM(S): 2400 POWDER, FOR SUSPENSION ORAL at 10:17

## 2017-01-11 RX ADMIN — HYDROMORPHONE HYDROCHLORIDE 2 MILLIGRAM(S): 2 INJECTION INTRAMUSCULAR; INTRAVENOUS; SUBCUTANEOUS at 16:35

## 2017-01-11 RX ADMIN — ALBUTEROL 2.5 MILLIGRAM(S): 90 AEROSOL, METERED ORAL at 20:16

## 2017-01-11 RX ADMIN — SEVELAMER CARBONATE 800 MILLIGRAM(S): 2400 POWDER, FOR SUSPENSION ORAL at 12:34

## 2017-01-11 RX ADMIN — SENNA PLUS 2 TABLET(S): 8.6 TABLET ORAL at 21:45

## 2017-01-11 RX ADMIN — HYDROMORPHONE HYDROCHLORIDE 2 MILLIGRAM(S): 2 INJECTION INTRAMUSCULAR; INTRAVENOUS; SUBCUTANEOUS at 06:20

## 2017-01-11 RX ADMIN — SEVELAMER CARBONATE 800 MILLIGRAM(S): 2400 POWDER, FOR SUSPENSION ORAL at 18:39

## 2017-01-11 RX ADMIN — HEPARIN SODIUM 7500 UNIT(S): 5000 INJECTION INTRAVENOUS; SUBCUTANEOUS at 10:17

## 2017-01-11 RX ADMIN — HEPARIN SODIUM 7500 UNIT(S): 5000 INJECTION INTRAVENOUS; SUBCUTANEOUS at 01:27

## 2017-01-11 RX ADMIN — Medication 1 MILLIGRAM(S): at 12:34

## 2017-01-11 RX ADMIN — BUDESONIDE AND FORMOTEROL FUMARATE DIHYDRATE 2 PUFF(S): 160; 4.5 AEROSOL RESPIRATORY (INHALATION) at 20:17

## 2017-01-11 RX ADMIN — HEPARIN SODIUM 7500 UNIT(S): 5000 INJECTION INTRAVENOUS; SUBCUTANEOUS at 18:38

## 2017-01-11 NOTE — DISCHARGE NOTE ADULT - NS AS DC STROKE ED MATERIALS
Need for Followup After Discharge/Stroke Education Booklet/Call 911 for Stroke/Stroke Warning Signs and Symptoms/Prescribed Medications/Risk Factors for Stroke

## 2017-01-11 NOTE — DISCHARGE NOTE ADULT - CARE PROVIDERS DIRECT ADDRESSES
,DirectAddress_Unknown,[email or web address]

## 2017-01-11 NOTE — PROGRESS NOTE ADULT - SUBJECTIVE AND OBJECTIVE BOX
ROSALVA RODRIGUES is a 56y Male with HPI:  This is a 55 y/o male with a hx of atrial fibrillation (not on AC), Anuria, Aortic stenosis, Asthma, CAD, Cirrhosis, COPD, ESRD on Dialysis (T/T/S), HIV, Hepatitis C, HDL, HTN and back surgery presents to the ED stating that while sitting on his bed he "blacked out" upon awakening he felt dizziness with lasted until he arrived to the ER. pt was here earlier s/p fall and hitting his knee. Currently no family at bedside, but as per documentation in ER his daughter indicated that patient had exerted himself a little, eye's rolled back and he became unresponsive for one minute. patient denies any chest pain, palpitations, nausea, vomiting, diarrhea, headache, visual changes.   UNCLEAR ETIOLOGY POSSIBLE VASO VAGAL EP NOTE APPRECIATED  LOOP RECORDER PLACED YESTERDAY  PT WITH  DECREASED  TREMORS TODAY    Multiple similar episodes prior secondary to hypotension. (09 Jan 2017 06:46)        Allergies:  No Known Drug Allergies  shellfish (Hives)      Medications:  aspirin enteric coated 81milliGRAM(s) Oral daily  oxyCODONE IR 5milliGRAM(s) Oral every 4 hours PRN  atorvastatin 10milliGRAM(s) Oral at bedtime  clopidogrel Tablet 75milliGRAM(s) Oral daily  zidovudine 300 mG/lamiVUDine 150 mG 1Tablet(s) Oral two times a day  metoprolol 100milliGRAM(s) Oral two times a day  ALBUTerol    0.083% 2.5milliGRAM(s) Nebulizer every 6 hours  ALBUTerol    90 MICROgram(s) HFA Inhaler 1Puff(s) Inhalation every 4 hours  buDESOnide 160 MICROgram(s)/formoterol 4.5 MICROgram(s) Inhaler 2Puff(s) Inhalation two times a day  docusate sodium 100milliGRAM(s) Oral three times a day  polyethylene glycol 3350 17Gram(s) Oral daily  sevelamer hydrochloride 800milliGRAM(s) Oral three times a day with meals  folic acid 1milliGRAM(s) Oral daily  heparin  Injectable 7500Unit(s) SubCutaneous every 8 hours  HYDROmorphone   Tablet 2milliGRAM(s) Oral every 4 hours PRN      ANTIBIOTICS:NONE         Review of Systems: - Negative except as mentioned above     Physical Exam:  Vital Signs Last 24 Hrs  T(C): 37.1, Max: 37.1 (01-11 @ 05:48)  T(F): 98.7, Max: 98.7 (01-11 @ 05:48)  HR: 116 (76 - 116)  BP: 119/82 (97/57 - 119/82)  BP(mean): --  RR: 16 (16 - 18)  SpO2: 94% (94% - 100%)    GEN: NAD, pleasant  HEENT: normocephalic and atraumatic. EOMI. PRESTON...  NECK: Supple. No carotid bruits.  No lymphadenopathy or thyromegaly.  LUNGS: Clear to auscultation.  HEART: Regular rate and rhythm without murmur.  ABDOMEN: Soft, nontender, and nondistended.  Positive bowel sounds.  No hepatosplenomegaly was noted.  NO REBOUND NO GUARDING  EXTREMITIES RIGHT TIBIAL PAIN  NEUROLOGIC: Cranial nerves II through XII are grossly intact.  MINIMAL  TREMORS AT REST    SKIN: No ulceration or induration present.      Labs:  09 Jan 2017 23:21    134    |  89     |  48.0   ----------------------------<  106    5.0     |  26.0   |  6.41     Ca    9.4        09 Jan 2017 23:21  Mg     2.9       09 Jan 2017 23:21    TPro  7.9    /  Alb  3.4    /  TBili  0.5    /  DBili  x      /  AST  20     /  ALT  14     /  AlkPhos  164    09 Jan 2017 01:23                          14.4   8.76  )-----------( 235      ( 09 Jan 2017 01:23 )             44.8           LIVER FUNCTIONS - ( 09 Jan 2017 01:23 )  Alb: 3.4 g/dL / Pro: 7.9 g/dL / ALK PHOS: 164 U/L / ALT: 14 U/L / AST: 20 U/L / GGT: x           CARDIAC MARKERS ( 09 Jan 2017 07:40 )  x     / 0.86 ng/mL / 75 U/L / x     / x      CARDIAC MARKERS ( 09 Jan 2017 01:23 )  x     / 0.81 ng/mL / x     / x     / x          CAPILLARY BLOOD GLUCOSE        RECENT CULTURES:

## 2017-01-11 NOTE — DISCHARGE NOTE ADULT - PATIENT PORTAL LINK FT
“You can access the Follow[employer] Patient Portal, offered by [facility], by registering with the following website: [email or web address]”

## 2017-01-11 NOTE — DISCHARGE NOTE ADULT - CARE PLAN
Principal Discharge DX:	Syncope, unspecified syncope type  Goal:	improvement  Instructions for follow-up, activity and diet:	follow up with dr steel  Secondary Diagnosis:	Essential hypertension  Secondary Diagnosis:	HIV disease  Secondary Diagnosis:	Renal osteodystrophy  Secondary Diagnosis:	Kidney failure

## 2017-01-11 NOTE — PROGRESS NOTE ADULT - SUBJECTIVE AND OBJECTIVE BOX
[facility] [place]                                   [address] [place] [address]                                                      PHONE: [phone]                                                         FAX: [phone]  [email or web address]  ---------------------------------------------------------------------------------------------------------------------------------    Overnight events/patient complaints:s/p loop, HR controlled w lopressor      No Known Drug Allergies  shellfish (Hives)    MEDICATIONS  (STANDING):  aspirin enteric coated 81milliGRAM(s) Oral daily  atorvastatin 10milliGRAM(s) Oral at bedtime  clopidogrel Tablet 75milliGRAM(s) Oral daily  zidovudine 300 mG/lamiVUDine 150 mG 1Tablet(s) Oral two times a day  metoprolol 100milliGRAM(s) Oral two times a day  ALBUTerol    0.083% 2.5milliGRAM(s) Nebulizer every 6 hours  ALBUTerol    90 MICROgram(s) HFA Inhaler 1Puff(s) Inhalation every 4 hours  buDESOnide 160 MICROgram(s)/formoterol 4.5 MICROgram(s) Inhaler 2Puff(s) Inhalation two times a day  docusate sodium 100milliGRAM(s) Oral three times a day  polyethylene glycol 3350 17Gram(s) Oral daily  sevelamer hydrochloride 800milliGRAM(s) Oral three times a day with meals  folic acid 1milliGRAM(s) Oral daily  heparin  Injectable 7500Unit(s) SubCutaneous every 8 hours  senna 2Tablet(s) Oral at bedtime    MEDICATIONS  (PRN):  oxyCODONE IR 5milliGRAM(s) Oral every 4 hours PRN Moderate Pain  HYDROmorphone   Tablet 2milliGRAM(s) Oral every 4 hours PRN Severe Pain (7 - 10)  guaiFENesin    Syrup 200milliGRAM(s) Oral every 6 hours PRN Cough      Vital Signs Last 24 Hrs  T(C): 37.1, Max: 37.1 ( @ 05:48)  T(F): 98.7, Max: 98.7 ( @ 05:48)  HR: 116 (76 - 116)  BP: 119/82 (97/57 - 119/82)  BP(mean): --  RR: 16 (16 - 18)  SpO2: 94% (94% - 100%)  Daily     Daily Weight in k.9 (2017 05:10)  ICU Vital Signs Last 24 Hrs  ROSALVA RODRIGUES  I&O's Detail    I&O's Summary    Drug Dosing Weight  ROSALVA RODRIGUES      PHYSICAL EXAM:  General: Appears well developed, well nourished alert and cooperative.  HEENT: Head; normocephalic, atraumatic.  Eyes: Pupils reactive, cornea wnl.  Neck: Supple, no nodes adenopathy, no NVD or carotid bruit or thyromegaly.  CARDIOVASCULAR: Normal S1 and S2, No murmur, rub, gallop or lift.   LUNGS: No rales, rhonchi or wheeze. Normal breath sounds bilaterally.  ABDOMEN: Soft, nontender without mass or organomegaly. bowel sounds normoactive.  EXTREMITIES: No clubbing, cyanosis or edema. Distal pulses wnl.   SKIN: warm and dry with normal turgor.  NEURO: Alert/oriented x 3/normal motor exam. No pathologic reflexes.    PSYCH: normal affect.        LABS:    2017 23:21    134    |  89     |  48.0   ----------------------------<  106    5.0     |  26.0   |  6.41     Ca    9.4        2017 23:21  Mg     2.9       2017 23:21      ROSALVA RODRIGUES            RADIOLOGY & ADDITIONAL STUDIES:    INTERPRETATION OF TELEMETRY (personally reviewed):AF w CVR

## 2017-01-11 NOTE — DISCHARGE NOTE ADULT - HOSPITAL COURSE
PT IS  55YO MALE WITH CAD ESRD ON HD WITH EPISODE OF NEAR SYNCOPE VS SYNCOPE FALL SEEN IN ER THEN GIVEN PAIN MEDS AND RETURNED TO ER  PT SEEN BY CARDIOLOGY AND EP LOOP RECORDER PLACED HAD SOME TREMORS AND EEG DONE RESULTS PENDING  ON DILAYSIS NOW PT STABLE FOR D/C TO HOME

## 2017-01-11 NOTE — PHYSICAL THERAPY INITIAL EVALUATION ADULT - ADDITIONAL COMMENTS
Pt reports living with family in a 2 story house with 3 steps to enter.  Amb with SAC and was independent with all ADLs and self care PTA.

## 2017-01-11 NOTE — PROGRESS NOTE ADULT - SUBJECTIVE AND OBJECTIVE BOX
NEPHROLOGY INTERVAL HPI/OVERNIGHT EVENTS:  no acute distress noted  tolerated HD yesterday  no cp, sob, n/v/d  no further syncope    MEDICATIONS  (STANDING):  aspirin enteric coated 81milliGRAM(s) Oral daily  atorvastatin 10milliGRAM(s) Oral at bedtime  clopidogrel Tablet 75milliGRAM(s) Oral daily  zidovudine 300 mG/lamiVUDine 150 mG 1Tablet(s) Oral two times a day  metoprolol 100milliGRAM(s) Oral two times a day  ALBUTerol    0.083% 2.5milliGRAM(s) Nebulizer every 6 hours  ALBUTerol    90 MICROgram(s) HFA Inhaler 1Puff(s) Inhalation every 4 hours  buDESOnide 160 MICROgram(s)/formoterol 4.5 MICROgram(s) Inhaler 2Puff(s) Inhalation two times a day  docusate sodium 100milliGRAM(s) Oral three times a day  polyethylene glycol 3350 17Gram(s) Oral daily  sevelamer hydrochloride 800milliGRAM(s) Oral three times a day with meals  folic acid 1milliGRAM(s) Oral daily  heparin  Injectable 7500Unit(s) SubCutaneous every 8 hours  senna 2Tablet(s) Oral at bedtime    MEDICATIONS  (PRN):  oxyCODONE IR 5milliGRAM(s) Oral every 4 hours PRN Moderate Pain  HYDROmorphone   Tablet 2milliGRAM(s) Oral every 4 hours PRN Severe Pain (7 - 10)  guaiFENesin    Syrup 200milliGRAM(s) Oral every 6 hours PRN Cough      Allergies    No Known Drug Allergies  shellfish (Hives)    Intolerances        Vital Signs Last 24 Hrs  T(C): 37.1, Max: 37.1 (01-11 @ 05:48)  T(F): 98.7, Max: 98.7 (01-11 @ 05:48)  HR: 116 (88 - 116)  BP: 119/82 (97/57 - 119/82)  BP(mean): --  RR: 16 (16 - 18)  SpO2: 94% (94% - 100%)    PHYSICAL EXAM:    GENERAL: NAD  HEAD:  Atraumatic, Normocephalic  EYES: EOMI, PERRLA, conjunctiva and sclera clear  NECK: Supple, No JVD  NERVOUS SYSTEM:  Alert & Oriented X3, non focal  CHEST/LUNG: Clear to percussion bilaterally; No rales, rhonchi, wheezing, or rubs  HEART: Regular rate and rhythm; No murmurs, rubs, or gallops  ABDOMEN: Soft, Nontender, Nondistended; Bowel sounds present  EXTREMITIES:  2+ Peripheral Pulses, No clubbing, cyanosis, or edema  LYMPH: No lymphadenopathy noted  SKIN: No rashes or lesions    LABS:    09 Jan 2017 23:21    134    |  89     |  48.0   ----------------------------<  106    5.0     |  26.0   |  6.41     Ca    9.4        09 Jan 2017 23:21  Mg     2.9       09 Jan 2017 23:21      Hemoglobin: 14.4 g/dL (01.09.17 @ 01:23)            RADIOLOGY & ADDITIONAL TESTS:

## 2017-01-11 NOTE — DISCHARGE NOTE ADULT - CARE PROVIDER_API CALL
Jack Barbosa), Infectious Disease; Internal Medicine  [address] [place] [address]  Phone: [phone]  Fax: [phone]

## 2017-01-11 NOTE — PROGRESS NOTE ADULT - SUBJECTIVE AND OBJECTIVE BOX
[facility]                                 Morgan Mccord, & Quoc                                  [address]                                        [place], [place], [address]                                             [phone]      Vital signs:  T(C): 37.1, Max: 37.1 (01-11 @ 05:48)  HR: 116 (76 - 116)  BP: 119/82 (97/57 - 119/82)  RR: 16 (16 - 18)  SpO2: 94% (94% - 100%)  Wt(kg): --    Exam:    No new complaints.  Awake and alert.  speech language intact  Pupils react.  Face symmetric smile and sensation  hearing symmetric  tongue ML  5/5 power in BUE, mild BLE weakness, with superimposed joint pain  no drift  intact FT x 4 ext  difficulty with ambulation, needs assistance

## 2017-01-11 NOTE — PHYSICAL THERAPY INITIAL EVALUATION ADULT - FOLLOWS COMMANDS/ANSWERS QUESTIONS, REHAB EVAL
3/12/2020              KELLY CRUZ [address] [place] [id number]         To whom it may concern,    This is to certify that Mr vYonne Lindsey is our patient and under our care.  Due to his constant neck pain/headaches, he h
75% of the time

## 2017-01-12 VITALS
TEMPERATURE: 98 F | SYSTOLIC BLOOD PRESSURE: 99 MMHG | DIASTOLIC BLOOD PRESSURE: 47 MMHG | OXYGEN SATURATION: 95 % | WEIGHT: 160.94 LBS | RESPIRATION RATE: 18 BRPM | HEART RATE: 89 BPM

## 2017-01-12 PROCEDURE — 82550 ASSAY OF CK (CPK): CPT

## 2017-01-12 PROCEDURE — 73562 X-RAY EXAM OF KNEE 3: CPT

## 2017-01-12 PROCEDURE — 83735 ASSAY OF MAGNESIUM: CPT

## 2017-01-12 PROCEDURE — 99239 HOSP IP/OBS DSCHRG MGMT >30: CPT

## 2017-01-12 PROCEDURE — 95819 EEG AWAKE AND ASLEEP: CPT

## 2017-01-12 PROCEDURE — 70450 CT HEAD/BRAIN W/O DYE: CPT

## 2017-01-12 PROCEDURE — 99285 EMERGENCY DEPT VISIT HI MDM: CPT | Mod: 25

## 2017-01-12 PROCEDURE — 93308 TTE F-UP OR LMTD: CPT

## 2017-01-12 PROCEDURE — 93005 ELECTROCARDIOGRAM TRACING: CPT

## 2017-01-12 PROCEDURE — 84484 ASSAY OF TROPONIN QUANT: CPT

## 2017-01-12 PROCEDURE — 80048 BASIC METABOLIC PNL TOTAL CA: CPT

## 2017-01-12 PROCEDURE — 99261: CPT

## 2017-01-12 PROCEDURE — 93306 TTE W/DOPPLER COMPLETE: CPT

## 2017-01-12 PROCEDURE — 96372 THER/PROPH/DIAG INJ SC/IM: CPT | Mod: XU

## 2017-01-12 PROCEDURE — 72170 X-RAY EXAM OF PELVIS: CPT

## 2017-01-12 PROCEDURE — 72100 X-RAY EXAM L-S SPINE 2/3 VWS: CPT

## 2017-01-12 PROCEDURE — 36415 COLL VENOUS BLD VENIPUNCTURE: CPT

## 2017-01-12 PROCEDURE — 94640 AIRWAY INHALATION TREATMENT: CPT

## 2017-01-12 PROCEDURE — C1764: CPT

## 2017-01-12 PROCEDURE — 71045 X-RAY EXAM CHEST 1 VIEW: CPT

## 2017-01-12 PROCEDURE — 96374 THER/PROPH/DIAG INJ IV PUSH: CPT

## 2017-01-12 PROCEDURE — 97163 PT EVAL HIGH COMPLEX 45 MIN: CPT

## 2017-01-12 PROCEDURE — 85027 COMPLETE CBC AUTOMATED: CPT

## 2017-01-12 PROCEDURE — 80053 COMPREHEN METABOLIC PANEL: CPT

## 2017-01-12 PROCEDURE — 93880 EXTRACRANIAL BILAT STUDY: CPT

## 2017-01-12 PROCEDURE — 99284 EMERGENCY DEPT VISIT MOD MDM: CPT | Mod: 25

## 2017-01-12 RX ADMIN — SEVELAMER CARBONATE 800 MILLIGRAM(S): 2400 POWDER, FOR SUSPENSION ORAL at 08:33

## 2017-01-12 RX ADMIN — ALBUTEROL 2.5 MILLIGRAM(S): 90 AEROSOL, METERED ORAL at 03:09

## 2017-01-12 RX ADMIN — Medication 1 MILLIGRAM(S): at 13:43

## 2017-01-12 RX ADMIN — HEPARIN SODIUM 7500 UNIT(S): 5000 INJECTION INTRAVENOUS; SUBCUTANEOUS at 01:17

## 2017-01-12 RX ADMIN — HYDROMORPHONE HYDROCHLORIDE 2 MILLIGRAM(S): 2 INJECTION INTRAMUSCULAR; INTRAVENOUS; SUBCUTANEOUS at 10:15

## 2017-01-12 RX ADMIN — HYDROMORPHONE HYDROCHLORIDE 2 MILLIGRAM(S): 2 INJECTION INTRAMUSCULAR; INTRAVENOUS; SUBCUTANEOUS at 09:15

## 2017-01-12 RX ADMIN — HEPARIN SODIUM 7500 UNIT(S): 5000 INJECTION INTRAVENOUS; SUBCUTANEOUS at 06:16

## 2017-01-12 RX ADMIN — Medication 100 MILLIGRAM(S): at 06:16

## 2017-01-12 RX ADMIN — POLYETHYLENE GLYCOL 3350 17 GRAM(S): 17 POWDER, FOR SOLUTION ORAL at 13:46

## 2017-01-12 RX ADMIN — SEVELAMER CARBONATE 800 MILLIGRAM(S): 2400 POWDER, FOR SUSPENSION ORAL at 13:43

## 2017-01-12 RX ADMIN — HEPARIN SODIUM 7500 UNIT(S): 5000 INJECTION INTRAVENOUS; SUBCUTANEOUS at 13:46

## 2017-01-12 RX ADMIN — Medication 81 MILLIGRAM(S): at 13:44

## 2017-01-12 RX ADMIN — Medication 100 MILLIGRAM(S): at 13:44

## 2017-01-12 RX ADMIN — CLOPIDOGREL BISULFATE 75 MILLIGRAM(S): 75 TABLET, FILM COATED ORAL at 13:44

## 2017-01-12 NOTE — PROGRESS NOTE ADULT - SUBJECTIVE AND OBJECTIVE BOX
[facility]                                 Morgan Mccord, & Quoc                                  [address]                                        [place], [place], [address]                                             [phone]      Vital signs:  T(C): 36.8, Max: 37.3 (01-11 @ 15:00)  HR: 89 (84 - 113)  BP: 99/47 (99/47 - 129/55)  RR: 18 (14 - 18)  SpO2: 95% (95% - 99%)  Wt(kg): --    Exam:    No new complaints.  Awake and alert.  speech language intact  Pupils react.  Face symmetric smile and sensation  hearing symmetric  tongue ML  diffuse weakness  intact FT x 4 ext      eeg shows mild slowing, no seizures.

## 2017-01-12 NOTE — PROGRESS NOTE ADULT - SUBJECTIVE AND OBJECTIVE BOX
NEPHROLOGY INTERVAL HPI/OVERNIGHT EVENTS:  pt tolerating HD now  no acute distress  no cp, sob, n/v/d    MEDICATIONS  (STANDING):  aspirin enteric coated 81milliGRAM(s) Oral daily  atorvastatin 10milliGRAM(s) Oral at bedtime  clopidogrel Tablet 75milliGRAM(s) Oral daily  zidovudine 300 mG/lamiVUDine 150 mG 1Tablet(s) Oral two times a day  metoprolol 100milliGRAM(s) Oral two times a day  ALBUTerol    0.083% 2.5milliGRAM(s) Nebulizer every 6 hours  ALBUTerol    90 MICROgram(s) HFA Inhaler 1Puff(s) Inhalation every 4 hours  buDESOnide 160 MICROgram(s)/formoterol 4.5 MICROgram(s) Inhaler 2Puff(s) Inhalation two times a day  docusate sodium 100milliGRAM(s) Oral three times a day  polyethylene glycol 3350 17Gram(s) Oral daily  sevelamer hydrochloride 800milliGRAM(s) Oral three times a day with meals  folic acid 1milliGRAM(s) Oral daily  heparin  Injectable 7500Unit(s) SubCutaneous every 8 hours  senna 2Tablet(s) Oral at bedtime    MEDICATIONS  (PRN):  oxyCODONE IR 5milliGRAM(s) Oral every 4 hours PRN Moderate Pain  HYDROmorphone   Tablet 2milliGRAM(s) Oral every 4 hours PRN Severe Pain (7 - 10)  guaiFENesin    Syrup 200milliGRAM(s) Oral every 6 hours PRN Cough      Allergies    No Known Drug Allergies  shellfish (Hives)    Vital Signs Last 24 Hrs  T(C): 36.5, Max: 37.3 (01-11 @ 15:00)  T(F): 97.7, Max: 99.2 (01-11 @ 15:00)  HR: 84 (84 - 113)  BP: 129/55 (104/66 - 129/55)  BP(mean): --  RR: 16 (14 - 18)  SpO2: 96% (96% - 99%)    PHYSICAL EXAM:    GENERAL: NAD  HEAD:  Atraumatic, Normocephalic  EYES: EOMI, PERRLA, conjunctiva and sclera clear  NECK: Supple, No JVD  NERVOUS SYSTEM:  Alert & Oriented X3  CHEST/LUNG: Clear bilaterally  HEART: No rub  ABDOMEN: Soft, Nontender, Nondistended; Bowel sounds present  EXTREMITIES:  2+ Peripheral Pulses, No clubbing, cyanosis, or edema      LABS:    Potassium, Serum: 5.0 mmol/L (01.09.17 @ 23:21)                  RADIOLOGY & ADDITIONAL TESTS:

## 2017-01-12 NOTE — PROGRESS NOTE ADULT - ASSESSMENT
55YO MALE DIALYSIS PT WITH WEAKNESS NEAR SYNCOPE SEEN BY CARDIOLOGY FOR LOOP RECORDER WILL  CALL NEUROLOGY TO EVALUATE FOR ? TREMORS  WILL HAVE PT EVALUATE
57YO MALE DIALYSIS PT WITH WEAKNESS NEAR SYNCOPE SEEN BY CARDIOLOGY FOR LOOP RECORDER    NEUROLOGY  CONSULT NOTED  FOR PHYSICAL THERAPY  OVERALL IMPROVED   POSSIBLE D/C IN AM
Assessment  Syncope ? re bradycardia vs pain meds  Chronic AF not AC candidate  Normal EF w/o sig valvular disease or ischema  ESRD on HD  s/p loop    Rec  Cont antiplatelet Rx  Cont lopressor at current dose  Will fu with loop monitoring in office  Likely DC in am  Recall as needed
ESRD s/p syncope  Thus far work up negative
PT IS A 56 MALE ESRD HIV WITH NEAR SYNCOPE  CARDIOLOGY FOLLOW UP NOTED   WILL CONTINUE PRESENT TREATMENT  EP TO [facility]
imp: tremor and syncope  await EEG    Suraj Mora MD, PhD   [id number]
no further tremors, eeg did not show seizure activity    There is no further neurologic workup suggested at this time.  We will be available for reconsultation as needed.    Thank you.   Suraj Mora MD, PhD  [id number]
ESRD s/p syncope  Thus far work up negative

## 2017-01-14 ENCOUNTER — INPATIENT (INPATIENT)
Facility: HOSPITAL | Age: 57
LOS: 1 days | Discharge: HOME | DRG: 312 | End: 2017-01-16
Attending: HOSPITALIST | Admitting: HOSPITALIST
Payer: MEDICARE

## 2017-01-14 VITALS
RESPIRATION RATE: 20 BRPM | SYSTOLIC BLOOD PRESSURE: 89 MMHG | TEMPERATURE: 97 F | WEIGHT: 160.06 LBS | HEART RATE: 76 BPM | DIASTOLIC BLOOD PRESSURE: 69 MMHG | HEIGHT: 70 IN | OXYGEN SATURATION: 95 %

## 2017-01-14 DIAGNOSIS — I95.9 HYPOTENSION, UNSPECIFIED: ICD-10-CM

## 2017-01-14 DIAGNOSIS — N18.6 END STAGE RENAL DISEASE: ICD-10-CM

## 2017-01-14 DIAGNOSIS — D63.8 ANEMIA IN OTHER CHRONIC DISEASES CLASSIFIED ELSEWHERE: ICD-10-CM

## 2017-01-14 LAB
ALBUMIN SERPL ELPH-MCNC: 3.6 G/DL — SIGNIFICANT CHANGE UP (ref 3.3–5.2)
ALP SERPL-CCNC: 128 U/L — HIGH (ref 40–120)
ALT FLD-CCNC: 12 U/L — SIGNIFICANT CHANGE UP
ANION GAP SERPL CALC-SCNC: 21 MMOL/L — HIGH (ref 5–17)
APTT BLD: 30.8 SEC — SIGNIFICANT CHANGE UP (ref 27.5–37.4)
AST SERPL-CCNC: 23 U/L — SIGNIFICANT CHANGE UP
BILIRUB SERPL-MCNC: 0.6 MG/DL — SIGNIFICANT CHANGE UP (ref 0.4–2)
BUN SERPL-MCNC: 39 MG/DL — HIGH (ref 8–20)
CALCIUM SERPL-MCNC: 9.7 MG/DL — SIGNIFICANT CHANGE UP (ref 8.6–10.2)
CHLORIDE SERPL-SCNC: 87 MMOL/L — LOW (ref 98–107)
CO2 SERPL-SCNC: 25 MMOL/L — SIGNIFICANT CHANGE UP (ref 22–29)
CREAT SERPL-MCNC: 6.13 MG/DL — HIGH (ref 0.5–1.3)
GLUCOSE SERPL-MCNC: 116 MG/DL — HIGH (ref 70–115)
HCT VFR BLD CALC: 39.5 % — LOW (ref 42–52)
HGB BLD-MCNC: 13.1 G/DL — LOW (ref 14–18)
INR BLD: 1.28 RATIO — HIGH (ref 0.88–1.16)
LACTATE BLDV-MCNC: 3.1 MMOL/L — HIGH (ref 0.7–2)
MCHC RBC-ENTMCNC: 28.9 PG — SIGNIFICANT CHANGE UP (ref 27–31)
MCHC RBC-ENTMCNC: 33.2 G/DL — SIGNIFICANT CHANGE UP (ref 32–36)
MCV RBC AUTO: 87.2 FL — SIGNIFICANT CHANGE UP (ref 80–94)
PLATELET # BLD AUTO: 291 K/UL — SIGNIFICANT CHANGE UP (ref 150–400)
POTASSIUM SERPL-MCNC: 5 MMOL/L — SIGNIFICANT CHANGE UP (ref 3.5–5.3)
POTASSIUM SERPL-SCNC: 5 MMOL/L — SIGNIFICANT CHANGE UP (ref 3.5–5.3)
PROT SERPL-MCNC: 8.4 G/DL — SIGNIFICANT CHANGE UP (ref 6.6–8.7)
PROTHROM AB SERPL-ACNC: 14.1 SEC — HIGH (ref 10–13.1)
RBC # BLD: 4.53 M/UL — LOW (ref 4.6–6.2)
RBC # FLD: 15.9 % — HIGH (ref 11–15.6)
SODIUM SERPL-SCNC: 133 MMOL/L — LOW (ref 135–145)
TROPONIN T SERPL-MCNC: 0.79 NG/ML — CRITICAL HIGH (ref 0–0.06)
WBC # BLD: 8.47 K/UL — SIGNIFICANT CHANGE UP (ref 4.8–10.8)
WBC # FLD AUTO: 8.47 K/UL — SIGNIFICANT CHANGE UP (ref 4.8–10.8)

## 2017-01-14 PROCEDURE — 99223 1ST HOSP IP/OBS HIGH 75: CPT | Mod: AI

## 2017-01-14 PROCEDURE — 93971 EXTREMITY STUDY: CPT | Mod: 26,RT

## 2017-01-14 PROCEDURE — 71020: CPT | Mod: 26

## 2017-01-14 PROCEDURE — 93010 ELECTROCARDIOGRAM REPORT: CPT

## 2017-01-14 PROCEDURE — 99285 EMERGENCY DEPT VISIT HI MDM: CPT | Mod: 25

## 2017-01-14 RX ORDER — SODIUM CHLORIDE 9 MG/ML
250 INJECTION INTRAMUSCULAR; INTRAVENOUS; SUBCUTANEOUS ONCE
Qty: 0 | Refills: 0 | Status: COMPLETED | OUTPATIENT
Start: 2017-01-14 | End: 2017-01-14

## 2017-01-14 RX ORDER — MIDODRINE HYDROCHLORIDE 2.5 MG/1
5 TABLET ORAL THREE TIMES A DAY
Qty: 0 | Refills: 0 | Status: DISCONTINUED | OUTPATIENT
Start: 2017-01-14 | End: 2017-01-16

## 2017-01-14 RX ORDER — SODIUM CHLORIDE 9 MG/ML
250 INJECTION INTRAMUSCULAR; INTRAVENOUS; SUBCUTANEOUS ONCE
Qty: 0 | Refills: 0 | Status: COMPLETED | OUTPATIENT
Start: 2017-01-14 | End: 2017-01-15

## 2017-01-14 RX ORDER — BUDESONIDE AND FORMOTEROL FUMARATE DIHYDRATE 160; 4.5 UG/1; UG/1
1 AEROSOL RESPIRATORY (INHALATION)
Qty: 0 | Refills: 0 | Status: DISCONTINUED | OUTPATIENT
Start: 2017-01-14 | End: 2017-01-16

## 2017-01-14 RX ORDER — ATORVASTATIN CALCIUM 80 MG/1
10 TABLET, FILM COATED ORAL AT BEDTIME
Qty: 0 | Refills: 0 | Status: DISCONTINUED | OUTPATIENT
Start: 2017-01-14 | End: 2017-01-16

## 2017-01-14 RX ORDER — AMPICILLIN SODIUM AND SULBACTAM SODIUM 250; 125 MG/ML; MG/ML
3 INJECTION, POWDER, FOR SUSPENSION INTRAMUSCULAR; INTRAVENOUS ONCE
Qty: 0 | Refills: 0 | Status: COMPLETED | OUTPATIENT
Start: 2017-01-14 | End: 2017-01-14

## 2017-01-14 RX ORDER — NIFEDIPINE 30 MG
90 TABLET, EXTENDED RELEASE 24 HR ORAL DAILY
Qty: 0 | Refills: 0 | Status: DISCONTINUED | OUTPATIENT
Start: 2017-01-14 | End: 2017-01-14

## 2017-01-14 RX ORDER — CLOPIDOGREL BISULFATE 75 MG/1
75 TABLET, FILM COATED ORAL DAILY
Qty: 0 | Refills: 0 | Status: DISCONTINUED | OUTPATIENT
Start: 2017-01-14 | End: 2017-01-16

## 2017-01-14 RX ORDER — ASPIRIN/CALCIUM CARB/MAGNESIUM 324 MG
81 TABLET ORAL DAILY
Qty: 0 | Refills: 0 | Status: DISCONTINUED | OUTPATIENT
Start: 2017-01-14 | End: 2017-01-16

## 2017-01-14 RX ORDER — FOLIC ACID 0.8 MG
1 TABLET ORAL DAILY
Qty: 0 | Refills: 0 | Status: DISCONTINUED | OUTPATIENT
Start: 2017-01-14 | End: 2017-01-16

## 2017-01-14 RX ORDER — AMPICILLIN SODIUM AND SULBACTAM SODIUM 250; 125 MG/ML; MG/ML
3 INJECTION, POWDER, FOR SUSPENSION INTRAMUSCULAR; INTRAVENOUS EVERY 24 HOURS
Qty: 0 | Refills: 0 | Status: DISCONTINUED | OUTPATIENT
Start: 2017-01-14 | End: 2017-01-16

## 2017-01-14 RX ORDER — METOPROLOL TARTRATE 50 MG
100 TABLET ORAL
Qty: 0 | Refills: 0 | Status: DISCONTINUED | OUTPATIENT
Start: 2017-01-14 | End: 2017-01-14

## 2017-01-14 RX ORDER — HEPARIN SODIUM 5000 [USP'U]/ML
5000 INJECTION INTRAVENOUS; SUBCUTANEOUS EVERY 8 HOURS
Qty: 0 | Refills: 0 | Status: DISCONTINUED | OUTPATIENT
Start: 2017-01-14 | End: 2017-01-16

## 2017-01-14 RX ADMIN — SODIUM CHLORIDE 250 MILLILITER(S): 9 INJECTION INTRAMUSCULAR; INTRAVENOUS; SUBCUTANEOUS at 08:22

## 2017-01-14 RX ADMIN — MIDODRINE HYDROCHLORIDE 5 MILLIGRAM(S): 2.5 TABLET ORAL at 11:22

## 2017-01-14 RX ADMIN — MIDODRINE HYDROCHLORIDE 5 MILLIGRAM(S): 2.5 TABLET ORAL at 14:41

## 2017-01-14 RX ADMIN — AMPICILLIN SODIUM AND SULBACTAM SODIUM 200 GRAM(S): 250; 125 INJECTION, POWDER, FOR SUSPENSION INTRAMUSCULAR; INTRAVENOUS at 11:23
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
## 2017-01-14 NOTE — H&P ADULT. - ASSESSMENT
Patient is a 56y old  Male who presents with a chief complaint of Recurrent Symptomatic Hypotension. Pt is usually hypertensive as per daughter at bedside, gets BB & CCB but has been running low since 1 week. Pt been on chronic opiates. Recently admitted for Syncope w/in the week . ECHO showed LVEF 40-45%, mod AS, mild MVR, No effusion . Carotid doppler showed R 50-69% occlusion . Serial CE (-). CT head w/o acute changes . Loop recorder placed and was to follow up with cardiology . EEG done showed no seizure activity. Hgb was stable ( 14) . Sent to ER from HD today due to low BP ( < 90 ) . Otherwise, feels relatively fine w/o sig . complaints . Denies any fever, chills, cough, diarrhea. Pt had a recent trauma & appears to have RLE cellulitis.     Hypotension- 2/2 ?opiate use vs autonomic dysfuntion, did not take any BP meds today, hold BP meds, r/o bacteremia, s/p ILR on last admission, f/u cardio as outpateint  ESRD on HD with fluid overload- clinically & on CXR- HD with miodrine as per renal  RLE cellulitis- non purulent,  IV Unsyn, f/u blood cx, procal  HIV- c/w home meds  COPD/Asthma stable- c/w home inhalers  CAD- c/w home meds  AF- lopressor & procardia on hold, not candidate for AC as per cardio on last admission

## 2017-01-14 NOTE — H&P ADULT. - EXTREMITIES COMMENTS
Right shin erythema/TTP, no open wound seen Right shin erythema/TTP, no open wound seen, + Right AVF

## 2017-01-14 NOTE — ED ADULT NURSE REASSESSMENT NOTE - NS ED NURSE REASSESS COMMENT FT1
spoke with dr patel requarding b/p.  spoke with pt and daughter as per pt recently being w/up for low b/p was on last admission also.
pt to US with transporter

## 2017-01-14 NOTE — ED ADULT NURSE NOTE - OBJECTIVE STATEMENT
I was at the dialysis center they refused to do dialysis because my b/p was low   I was d/garrett Thursday from here they put a monitor in to monitor my heart (loop reorder). pt states he has been having right leg pain and swelling for "a little while now." pt states he has also had a fever at home

## 2017-01-14 NOTE — ED PROVIDER NOTE - OBJECTIVE STATEMENT
55 yo male with hx esrd. copd sent from HD center for low blood pressure. Patient was recently admitted to [facility] for syncope workup. c/o feeling lightheaded this am. denies chest pain or shortness of breath.   last HD x 2 days ago, went to dialysis this am and was not dialyzed and transferred to ED due to hypotension.   c/o pain to RLE.   denies abdominal pain or back pain

## 2017-01-14 NOTE — H&P ADULT. - HISTORY OF PRESENT ILLNESS
Patient is a 56y old  Male who presents with a chief complaint of Recurrent Symptomatic Hypotension. Pt is usually hypertensive as per daughter at bedside, gets BB & CCB but has been running low since 1 week. Pt been on chronic opiates. Recently admitted for Syncope w/in the week . ECHO showed LVEF 40-45%, mod AS, mild MVR, No effusion . Carotid doppler showed R 50-69% occlusion . Serial CE (-). CT head w/o acute changes . Loop recorder placed and was to follow up with cardiology . EEG done showed no seizure activity. Hgb was stable ( 14) . Sent to ER from HD today due to low BP ( < 90 ) . Otherwise, feels relatively fine w/o sig . complaints . Denies any fever, chills, cough, diarrhea. Pt had a recent trauma & appears to have RLE cellulitis.

## 2017-01-14 NOTE — ED ADULT TRIAGE NOTE - CHIEF COMPLAINT QUOTE
I was at the dialysis center they rfused to do dialysis because my b/p was low   I was d/garrett thur from  here they put a monitor in to monitor my heart.     loop reorder I was at the dialysis center they refused to do dialysis because my b/p was low   I was d/garrett thur from  here they put a monitor in to monitor my heart.     loop reorder

## 2017-01-14 NOTE — ED PROVIDER NOTE - PROGRESS NOTE DETAILS
Dr Briggs evaluated patient in ED -- patient to be admitted for HD today  will tx for cellulitis  patient missed HD with evidence of fluid overload; so did not receive full fluid bolus

## 2017-01-14 NOTE — ED PROVIDER NOTE - PHYSICAL EXAMINATION
+fistula RUE  RLE: with mild swelling to lower leg; 1+ dp/pt pulses; no calf tenderness +warm; mild erythema

## 2017-01-14 NOTE — CONSULT NOTE ADULT - PROBLEM SELECTOR RECOMMENDATION 3
No clinical evidence of sepsis   Recent ECHO, Carotids, Head CT , CXR, events noted   + HIV   Check Cortisol and TFT's  Add midodrine 5 mg TID   Recent loop recorder planted ( [employer] )

## 2017-01-14 NOTE — ED PROVIDER NOTE - CARE PLAN
Principal Discharge DX:	Hypotension  Secondary Diagnosis:	Dialysis patient  Secondary Diagnosis:	Cellulitis

## 2017-01-14 NOTE — ED ADULT NURSE NOTE - CHIEF COMPLAINT QUOTE
I was at the dialysis center they refused to do dialysis because my b/p was low   I was d/garrett thur from  here they put a monitor in to monitor my heart.     loop reorder

## 2017-01-15 LAB
ANION GAP SERPL CALC-SCNC: 17 MMOL/L — SIGNIFICANT CHANGE UP (ref 5–17)
BASOPHILS # BLD AUTO: 0 K/UL — SIGNIFICANT CHANGE UP (ref 0–0.2)
BASOPHILS NFR BLD AUTO: 0.4 % — SIGNIFICANT CHANGE UP (ref 0–2)
BUN SERPL-MCNC: 25 MG/DL — HIGH (ref 8–20)
CALCIUM SERPL-MCNC: 9.4 MG/DL — SIGNIFICANT CHANGE UP (ref 8.6–10.2)
CHLORIDE SERPL-SCNC: 93 MMOL/L — LOW (ref 98–107)
CO2 SERPL-SCNC: 26 MMOL/L — SIGNIFICANT CHANGE UP (ref 22–29)
CREAT SERPL-MCNC: 4.23 MG/DL — HIGH (ref 0.5–1.3)
EOSINOPHIL # BLD AUTO: 0.2 K/UL — SIGNIFICANT CHANGE UP (ref 0–0.5)
EOSINOPHIL NFR BLD AUTO: 2.2 % — SIGNIFICANT CHANGE UP (ref 0–5)
GLUCOSE SERPL-MCNC: 101 MG/DL — SIGNIFICANT CHANGE UP (ref 70–115)
HCT VFR BLD CALC: 37.7 % — LOW (ref 42–52)
HGB BLD-MCNC: 12.1 G/DL — LOW (ref 14–18)
LYMPHOCYTES # BLD AUTO: 0.9 K/UL — LOW (ref 1–4.8)
LYMPHOCYTES # BLD AUTO: 10.6 % — LOW (ref 20–55)
MAGNESIUM SERPL-MCNC: 2.4 MG/DL — SIGNIFICANT CHANGE UP (ref 1.8–2.5)
MCHC RBC-ENTMCNC: 28.7 PG — SIGNIFICANT CHANGE UP (ref 27–31)
MCHC RBC-ENTMCNC: 32.1 G/DL — SIGNIFICANT CHANGE UP (ref 32–36)
MCV RBC AUTO: 89.3 FL — SIGNIFICANT CHANGE UP (ref 80–94)
MONOCYTES # BLD AUTO: 0.9 K/UL — HIGH (ref 0–0.8)
MONOCYTES NFR BLD AUTO: 11 % — HIGH (ref 3–10)
NEUTROPHILS # BLD AUTO: 6.3 K/UL — SIGNIFICANT CHANGE UP (ref 1.8–8)
NEUTROPHILS NFR BLD AUTO: 75.6 % — HIGH (ref 37–73)
PHOSPHATE SERPL-MCNC: 4.2 MG/DL — SIGNIFICANT CHANGE UP (ref 2.4–4.7)
PLATELET # BLD AUTO: 283 K/UL — SIGNIFICANT CHANGE UP (ref 150–400)
POTASSIUM SERPL-MCNC: 4.4 MMOL/L — SIGNIFICANT CHANGE UP (ref 3.5–5.3)
POTASSIUM SERPL-SCNC: 4.4 MMOL/L — SIGNIFICANT CHANGE UP (ref 3.5–5.3)
PROCALCITONIN SERPL-MCNC: 0.6 NG/ML — HIGH (ref 0–0.5)
RBC # BLD: 4.22 M/UL — LOW (ref 4.6–6.2)
RBC # FLD: 16 % — HIGH (ref 11–15.6)
SODIUM SERPL-SCNC: 136 MMOL/L — SIGNIFICANT CHANGE UP (ref 135–145)
TROPONIN T SERPL-MCNC: 0.71 NG/ML — CRITICAL HIGH (ref 0–0.06)
TROPONIN T SERPL-MCNC: 0.72 NG/ML — CRITICAL HIGH (ref 0–0.06)
WBC # BLD: 8.37 K/UL — SIGNIFICANT CHANGE UP (ref 4.8–10.8)
WBC # FLD AUTO: 8.37 K/UL — SIGNIFICANT CHANGE UP (ref 4.8–10.8)

## 2017-01-15 PROCEDURE — 99233 SBSQ HOSP IP/OBS HIGH 50: CPT

## 2017-01-15 RX ORDER — OXYCODONE HYDROCHLORIDE 5 MG/1
5 TABLET ORAL EVERY 4 HOURS
Qty: 0 | Refills: 0 | Status: DISCONTINUED | OUTPATIENT
Start: 2017-01-15 | End: 2017-01-16

## 2017-01-15 RX ORDER — TENOFOVIR DISOPROXIL FUMARATE 300 MG/1
300 TABLET, FILM COATED ORAL
Qty: 0 | Refills: 0 | COMMUNITY

## 2017-01-15 RX ORDER — NIFEDIPINE 30 MG
60 TABLET, EXTENDED RELEASE 24 HR ORAL DAILY
Qty: 0 | Refills: 0 | Status: DISCONTINUED | OUTPATIENT
Start: 2017-01-15 | End: 2017-01-15

## 2017-01-15 RX ORDER — LAMIVUDINE 150 MG
50 TABLET ORAL DAILY
Qty: 0 | Refills: 0 | Status: DISCONTINUED | OUTPATIENT
Start: 2017-01-15 | End: 2017-01-15

## 2017-01-15 RX ORDER — METOPROLOL TARTRATE 50 MG
50 TABLET ORAL
Qty: 0 | Refills: 0 | Status: DISCONTINUED | OUTPATIENT
Start: 2017-01-15 | End: 2017-01-15

## 2017-01-15 RX ORDER — RALTEGRAVIR 400 MG/1
400 TABLET, FILM COATED ORAL
Qty: 0 | Refills: 0 | COMMUNITY

## 2017-01-15 RX ORDER — METOPROLOL TARTRATE 50 MG
50 TABLET ORAL
Qty: 0 | Refills: 0 | Status: DISCONTINUED | OUTPATIENT
Start: 2017-01-15 | End: 2017-01-16

## 2017-01-15 RX ORDER — RALTEGRAVIR 400 MG/1
400 TABLET, FILM COATED ORAL
Qty: 0 | Refills: 0 | Status: DISCONTINUED | OUTPATIENT
Start: 2017-01-16 | End: 2017-01-16

## 2017-01-15 RX ORDER — LAMIVUDINE 150 MG
50 TABLET ORAL DAILY
Qty: 0 | Refills: 0 | Status: DISCONTINUED | OUTPATIENT
Start: 2017-01-16 | End: 2017-01-16

## 2017-01-15 RX ORDER — LAMIVUDINE 150 MG
50 TABLET ORAL
Qty: 0 | Refills: 0 | COMMUNITY

## 2017-01-15 RX ORDER — TENOFOVIR DISOPROXIL FUMARATE 300 MG/1
300 TABLET, FILM COATED ORAL
Qty: 0 | Refills: 0 | Status: DISCONTINUED | OUTPATIENT
Start: 2017-01-16 | End: 2017-01-16

## 2017-01-15 RX ORDER — NIFEDIPINE 30 MG
60 TABLET, EXTENDED RELEASE 24 HR ORAL DAILY
Qty: 0 | Refills: 0 | Status: DISCONTINUED | OUTPATIENT
Start: 2017-01-15 | End: 2017-01-16

## 2017-01-15 RX ADMIN — SODIUM CHLORIDE 250 MILLILITER(S): 9 INJECTION INTRAMUSCULAR; INTRAVENOUS; SUBCUTANEOUS at 00:26

## 2017-01-15 RX ADMIN — AMPICILLIN SODIUM AND SULBACTAM SODIUM 200 GRAM(S): 250; 125 INJECTION, POWDER, FOR SUSPENSION INTRAMUSCULAR; INTRAVENOUS at 00:26

## 2017-01-15 RX ADMIN — MIDODRINE HYDROCHLORIDE 5 MILLIGRAM(S): 2.5 TABLET ORAL at 00:27

## 2017-01-15 RX ADMIN — ATORVASTATIN CALCIUM 10 MILLIGRAM(S): 80 TABLET, FILM COATED ORAL at 00:29

## 2017-01-15 RX ADMIN — MIDODRINE HYDROCHLORIDE 5 MILLIGRAM(S): 2.5 TABLET ORAL at 05:23

## 2017-01-15 RX ADMIN — AMPICILLIN SODIUM AND SULBACTAM SODIUM 200 GRAM(S): 250; 125 INJECTION, POWDER, FOR SUSPENSION INTRAMUSCULAR; INTRAVENOUS at 23:41

## 2017-01-15 RX ADMIN — Medication 1 MILLIGRAM(S): at 14:57

## 2017-01-15 RX ADMIN — HEPARIN SODIUM 5000 UNIT(S): 5000 INJECTION INTRAVENOUS; SUBCUTANEOUS at 23:42

## 2017-01-15 RX ADMIN — Medication 50 MILLIGRAM(S): at 19:21

## 2017-01-15 RX ADMIN — ATORVASTATIN CALCIUM 10 MILLIGRAM(S): 80 TABLET, FILM COATED ORAL at 23:42

## 2017-01-15 RX ADMIN — BUDESONIDE AND FORMOTEROL FUMARATE DIHYDRATE 1 PUFF(S): 160; 4.5 AEROSOL RESPIRATORY (INHALATION) at 20:17

## 2017-01-15 RX ADMIN — CLOPIDOGREL BISULFATE 75 MILLIGRAM(S): 75 TABLET, FILM COATED ORAL at 14:57

## 2017-01-15 RX ADMIN — HEPARIN SODIUM 5000 UNIT(S): 5000 INJECTION INTRAVENOUS; SUBCUTANEOUS at 10:07

## 2017-01-15 RX ADMIN — BUDESONIDE AND FORMOTEROL FUMARATE DIHYDRATE 1 PUFF(S): 160; 4.5 AEROSOL RESPIRATORY (INHALATION) at 09:35

## 2017-01-15 RX ADMIN — MIDODRINE HYDROCHLORIDE 5 MILLIGRAM(S): 2.5 TABLET ORAL at 14:57

## 2017-01-15 RX ADMIN — Medication 50 MILLIGRAM(S): at 14:56

## 2017-01-15 RX ADMIN — Medication 81 MILLIGRAM(S): at 14:56

## 2017-01-15 RX ADMIN — MIDODRINE HYDROCHLORIDE 5 MILLIGRAM(S): 2.5 TABLET ORAL at 23:42

## 2017-01-15 RX ADMIN — OXYCODONE HYDROCHLORIDE 5 MILLIGRAM(S): 5 TABLET ORAL at 10:07

## 2017-01-15 RX ADMIN — HEPARIN SODIUM 5000 UNIT(S): 5000 INJECTION INTRAVENOUS; SUBCUTANEOUS at 19:21

## 2017-01-15 RX ADMIN — HEPARIN SODIUM 5000 UNIT(S): 5000 INJECTION INTRAVENOUS; SUBCUTANEOUS at 00:29

## 2017-01-15 RX ADMIN — OXYCODONE HYDROCHLORIDE 5 MILLIGRAM(S): 5 TABLET ORAL at 11:07

## 2017-01-15 NOTE — PROGRESS NOTE ADULT - ASSESSMENT
Patient is a 56y old  Male who presents with a chief complaint of Recurrent Symptomatic Hypotension. Pt is usually hypertensive as per daughter at bedside, gets BB & CCB but has been running low since 1 week. Pt been on chronic opiates. Recently admitted for Syncope w/in the week . ECHO showed LVEF 40-45%, mod AS, mild MVR, No effusion . Carotid doppler showed R 50-69% occlusion . Serial CE (-). CT head w/o acute changes . Loop recorder placed and was to follow up with cardiology . EEG done showed no seizure activity. Hgb was stable ( 14) . Sent to ER from HD today due to low BP ( < 90 ) . Otherwise, feels relatively fine w/o sig . complaints . Denies any fever, chills, cough, diarrhea. Pt had a recent trauma & appears to have RLE cellulitis.     1)Hypotension- 2/2 antihypertensive meds, opiate use, autonomic dysfunction- BP more stable today, will restart BB & CCB at lower doses, s/p ILR on last admission, f/u cardio as outpatient  2) Asymptomatic NSVT, CMP, with EKG changes & mild TnI elevation- [facility] cardio called, c/w asa, plavix, BB at lower dose with parameters, statin, last Echo was recent with EF 45%  3) ESRD on HD with fluid overload- clinically & on CXR- HD with midodrine as per renal  4)RLE cellulitis- non purulent,  IV Unsyn, f/u blood cx  5)HIV- c/w home meds, pt's family to bring in dosages today  6)COPD/Asthma stable- c/w home inhalers  7)CAD- c/w home meds  8)AF- lopressor & procardia at lower doses, not candidate for AC as per cardio on last admission    DVT ppx

## 2017-01-15 NOTE — PROGRESS NOTE ADULT - SUBJECTIVE AND OBJECTIVE BOX
CHIEF COMPLAINT/INTERVAL HISTORY:    Patient is a 56y old  Male who presents with a chief complaint of hypotension during HD (14 Jan 2017 18:23)      HPI:  Patient is a 56y old  Male who presents with a chief complaint of Recurrent Symptomatic Hypotension. Pt is usually hypertensive as per daughter at bedside, gets BB & CCB but has been running low since 1 week. Pt been on chronic opiates. Recently admitted for Syncope w/in the week . ECHO showed LVEF 40-45%, mod AS, mild MVR, No effusion . Carotid doppler showed R 50-69% occlusion . Serial CE (-). CT head w/o acute changes . Loop recorder placed and was to follow up with cardiology . EEG done showed no seizure activity. Hgb was stable ( 14) . Sent to ER from HD today due to low BP ( < 90 ) . Otherwise, feels relatively fine w/o sig . complaints . Denies any fever, chills, cough, diarrhea. Pt had a recent trauma & appears to have RLE cellulitis. (14 Jan 2017 18:23)      SUBJECTIVE & OBJECTIVE: Pt seen and examined at bedside. Denies any CP, palp, SOB, lightheadedness, syncope currently. Had an episode of asymptomatic NSVT this am on tele.     ICU Vital Signs Last 24 Hrs  T(C): 36.7, Max: 36.9 (01-14 @ 19:50)  T(F): 98.1, Max: 98.5 (01-14 @ 19:50)  HR: 92 (75 - 94)  BP: 131/67 (90/60 - 131/67)  BP(mean): --  ABP: --  ABP(mean): --  RR: 17 (17 - 18)  SpO2: 98% (97% - 98%)        MEDICATIONS  (STANDING):  midodrine 5milliGRAM(s) Oral three times a day  aspirin enteric coated 81milliGRAM(s) Oral daily  atorvastatin 10milliGRAM(s) Oral at bedtime  clopidogrel Tablet 75milliGRAM(s) Oral daily  zidovudine 300 mG/lamiVUDine 150 mG 1Tablet(s) Oral two times a day  buDESOnide 160 MICROgram(s)/formoterol 4.5 MICROgram(s) Inhaler 1Puff(s) Inhalation two times a day  folic acid 1milliGRAM(s) Oral daily  ampicillin/sulbactam  IVPB 3Gram(s) IV Intermittent every 24 hours  heparin  Injectable 5000Unit(s) SubCutaneous every 8 hours  metoprolol 50milliGRAM(s) Oral two times a day  NIFEdipine XL 60milliGRAM(s) Oral daily    MEDICATIONS  (PRN):  oxyCODONE IR 5milliGRAM(s) Oral every 4 hours PRN Moderate Pain (4 - 6)      LABS:                        12.1   8.37  )-----------( 283      ( 15 Jacobo 2017 10:24 )             37.7     15 Jacobo 2017 10:27    136    |  93     |  25.0   ----------------------------<  101    4.4     |  26.0   |  4.23     Ca    9.4        15 Jacobo 2017 10:27  Phos  4.2       15 Jacobo 2017 10:27  Mg     2.4       15 Jacobo 2017 10:27    TPro  8.4    /  Alb  3.6    /  TBili  0.6    /  DBili  x      /  AST  23     /  ALT  12     /  AlkPhos  128    14 Jan 2017 08:25    PT/INR - ( 14 Jan 2017 08:25 )   PT: 14.1 sec;   INR: 1.28 ratio         PTT - ( 14 Jan 2017 08:25 )  PTT:30.8 sec      CAPILLARY BLOOD GLUCOSE      RECENT CULTURES:      RADIOLOGY & ADDITIONAL TESTS:      PHYSICAL EXAM:    GENERAL: NAD, well-groomed, well-developed  HEAD:  Atraumatic, Normocephalic  EYES: EOMI, PERRLA, conjunctiva and sclera clear  ENMT: Moist mucous membranes  NECK: Supple, No JVD  NERVOUS SYSTEM:  Alert & Oriented X3, Motor Strength 5/5 B/L upper and lower extremities; DTRs 2+ intact and symmetric  CHEST/LUNG: Clear to auscultation bilaterally; No rales, rhonchi, wheezing, or rubs  HEART: Regular rate and rhythm; No murmurs, rubs, or gallops  ABDOMEN: Soft, Nontender, Nondistended; Bowel sounds present, mildline scars  EXTREMITIES:  2+ Peripheral Pulses, improving RLE cellulitis, + right AVF functional
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
## 2017-01-15 NOTE — PROGRESS NOTE ADULT - ASSESSMENT
Problem/Recommendation - 1:  Problem: ESRD (end stage renal disease). Recommendation: Next HD Tuesday  No sig . fluid overload       Problem/Recommendation - 2:  ·  Problem: Anemia, chronic disease.  Recommendation: Last Hgb > 11, hold epogen.     Problem/Recommendation - 3:  ·  Problem: Hypotension, unspecified hypotension type.  Recommendation: No clinical evidence of sepsis   Recent ECHO, Carotids, Head CT , CXR, events noted   + HIV   Check Cortisol and TFT's  Added midodrine 5 mg TID   Recent loop recorder planted ( [employer] ).   cardiology follow up noted   Abx for cellulitis

## 2017-01-15 NOTE — PROGRESS NOTE ADULT - SUBJECTIVE AND OBJECTIVE BOX
NEPHROLOGY INTERVAL HPI/OVERNIGHT EVENTS:  Cardiology follow up noted   Feels better   No new complaints   Abx for cellulitis     MEDICATIONS  (STANDING):  midodrine 5milliGRAM(s) Oral three times a day  aspirin enteric coated 81milliGRAM(s) Oral daily  atorvastatin 10milliGRAM(s) Oral at bedtime  clopidogrel Tablet 75milliGRAM(s) Oral daily  zidovudine 300 mG/lamiVUDine 150 mG 1Tablet(s) Oral two times a day  buDESOnide 160 MICROgram(s)/formoterol 4.5 MICROgram(s) Inhaler 1Puff(s) Inhalation two times a day  folic acid 1milliGRAM(s) Oral daily  ampicillin/sulbactam  IVPB 3Gram(s) IV Intermittent every 24 hours  heparin  Injectable 5000Unit(s) SubCutaneous every 8 hours  metoprolol 50milliGRAM(s) Oral two times a day  NIFEdipine XL 60milliGRAM(s) Oral daily    MEDICATIONS  (PRN):  oxyCODONE IR 5milliGRAM(s) Oral every 4 hours PRN Moderate Pain (4 - 6)      Allergies    No Known Drug Allergies  shellfish (Hives)    Intolerances            Vital Signs Last 24 Hrs  T(C): 36.7, Max: 36.9 (01-14 @ 19:50)  T(F): 98.1, Max: 98.5 (01-14 @ 19:50)  HR: 92 (75 - 94)  BP: 131/67 (90/60 - 131/67)  BP(mean): --  RR: 17 (17 - 18)  SpO2: 98% (97% - 98%)  Daily     Daily   I&O's Detail    I&O's Summary      PHYSICAL EXAM:    GENERAL: NAD, well-groomed, well-developed  HEAD:  Atraumatic, Normocephalic, Anicteric , no edema   NECK: Supple, No JVD,   NERVOUS SYSTEM:  No focal sensory or motor deficits   CHEST/LUNG: EAE , Clear , no wheeze   HEART: Regular rate and rhythm; No rub   ABDOMEN: Soft, Nontender, Nondistended; Bowel sounds present  EXTREMITIES:  2+ Peripheral Pulses, No edema , Access with thrill     LABS:                        12.1   8.37  )-----------( 283      ( 15 Jacobo 2017 10:24 )             37.7     15 Jacobo 2017 10:27    136    |  93     |  25.0   ----------------------------<  101    4.4     |  26.0   |  4.23     Ca    9.4        15 Jacobo 2017 10:27  Phos  4.2       15 Jacobo 2017 10:27  Mg     2.4       15 Jacobo 2017 10:27    TPro  8.4    /  Alb  3.6    /  TBili  0.6    /  DBili  x      /  AST  23     /  ALT  12     /  AlkPhos  128    14 Jan 2017 08:25    PT/INR - ( 14 Jan 2017 08:25 )   PT: 14.1 sec;   INR: 1.28 ratio         PTT - ( 14 Jan 2017 08:25 )  PTT:30.8 sec    Magnesium, Serum: 2.4 mg/dL (01-15 @ 10:27)  Phosphorus Level, Serum: 4.2 mg/dL (01-15 @ 10:27)          RADIOLOGY & ADDITIONAL TESTS:

## 2017-01-15 NOTE — CONSULT NOTE ADULT - ASSESSMENT
Assessment and Plan:    In summary, ROSALVA RODRIGUES is an 56y Male with past medical history significant for  NICMP LVEF 40-45% HIV CRF on HD, Afib not candidate for chronic AC secondary to multiple falls called to evaluate after he was found to have a minimally elevated trop I 0.79, recently hospitalized for rapid Afib underwent the placement of an ILR. Presently treated for cellulitis with IV ATB therapy      Telemetry monitoring  Serial cardiac markers and EKgs  Continue present cardiac therapy decreased in lopressor to 50 mg po BID ricardo ARSHAD MD, FACC, [facility] Assessment and Plan:    In summary, ROSALVA RODRIGUES is an 56y Male with past medical history significant for  NICMP LVEF 40-45% HIV CRF on HD, Afib not candidate for chronic AC secondary to multiple falls called to evaluate after he was found to have a minimally elevated trop I 0.79, recently hospitalized for rapid Afib underwent the placement of an ILR. Presently treated for cellulitis with IV ATB therapy      Telemetry monitoring  Serial cardiac markers and EKgs  Continue present cardiac therapy decreased in lopressor to 50 mg po BID noticed  Discussed with Dr Arun ARSHAD MD, FACC, [facility]

## 2017-01-16 ENCOUNTER — TRANSCRIPTION ENCOUNTER (OUTPATIENT)
Age: 57
End: 2017-01-16

## 2017-01-16 ENCOUNTER — APPOINTMENT (OUTPATIENT)
Dept: INTERNAL MEDICINE | Facility: CLINIC | Age: 57
End: 2017-01-16

## 2017-01-16 VITALS
TEMPERATURE: 98 F | DIASTOLIC BLOOD PRESSURE: 84 MMHG | SYSTOLIC BLOOD PRESSURE: 139 MMHG | RESPIRATION RATE: 18 BRPM | HEART RATE: 81 BPM

## 2017-01-16 LAB
ANION GAP SERPL CALC-SCNC: 20 MMOL/L — HIGH (ref 5–17)
BASOPHILS # BLD AUTO: 0 K/UL — SIGNIFICANT CHANGE UP (ref 0–0.2)
BASOPHILS NFR BLD AUTO: 0.4 % — SIGNIFICANT CHANGE UP (ref 0–2)
BUN SERPL-MCNC: 37 MG/DL — HIGH (ref 8–20)
CALCIUM SERPL-MCNC: 9.5 MG/DL — SIGNIFICANT CHANGE UP (ref 8.6–10.2)
CHLORIDE SERPL-SCNC: 96 MMOL/L — LOW (ref 98–107)
CO2 SERPL-SCNC: 26 MMOL/L — SIGNIFICANT CHANGE UP (ref 22–29)
CREAT SERPL-MCNC: 5.45 MG/DL — HIGH (ref 0.5–1.3)
EOSINOPHIL # BLD AUTO: 0.2 K/UL — SIGNIFICANT CHANGE UP (ref 0–0.5)
EOSINOPHIL NFR BLD AUTO: 2.2 % — SIGNIFICANT CHANGE UP (ref 0–5)
GLUCOSE SERPL-MCNC: 111 MG/DL — SIGNIFICANT CHANGE UP (ref 70–115)
HCT VFR BLD CALC: 38.1 % — LOW (ref 42–52)
HGB BLD-MCNC: 12.2 G/DL — LOW (ref 14–18)
LYMPHOCYTES # BLD AUTO: 1.2 K/UL — SIGNIFICANT CHANGE UP (ref 1–4.8)
LYMPHOCYTES # BLD AUTO: 14.3 % — LOW (ref 20–55)
MAGNESIUM SERPL-MCNC: 2.7 MG/DL — HIGH (ref 1.8–2.5)
MCHC RBC-ENTMCNC: 28.7 PG — SIGNIFICANT CHANGE UP (ref 27–31)
MCHC RBC-ENTMCNC: 32 G/DL — SIGNIFICANT CHANGE UP (ref 32–36)
MCV RBC AUTO: 89.6 FL — SIGNIFICANT CHANGE UP (ref 80–94)
MONOCYTES # BLD AUTO: 1 K/UL — HIGH (ref 0–0.8)
MONOCYTES NFR BLD AUTO: 11.9 % — HIGH (ref 3–10)
NEUTROPHILS # BLD AUTO: 5.9 K/UL — SIGNIFICANT CHANGE UP (ref 1.8–8)
NEUTROPHILS NFR BLD AUTO: 71 % — SIGNIFICANT CHANGE UP (ref 37–73)
PHOSPHATE SERPL-MCNC: 5.5 MG/DL — HIGH (ref 2.4–4.7)
PLATELET # BLD AUTO: 318 K/UL — SIGNIFICANT CHANGE UP (ref 150–400)
POTASSIUM SERPL-MCNC: 4.5 MMOL/L — SIGNIFICANT CHANGE UP (ref 3.5–5.3)
POTASSIUM SERPL-SCNC: 4.5 MMOL/L — SIGNIFICANT CHANGE UP (ref 3.5–5.3)
RBC # BLD: 4.25 M/UL — LOW (ref 4.6–6.2)
RBC # FLD: 16.2 % — HIGH (ref 11–15.6)
SODIUM SERPL-SCNC: 142 MMOL/L — SIGNIFICANT CHANGE UP (ref 135–145)
TROPONIN T SERPL-MCNC: 0.83 NG/ML — CRITICAL HIGH (ref 0–0.06)
WBC # BLD: 8.31 K/UL — SIGNIFICANT CHANGE UP (ref 4.8–10.8)
WBC # FLD AUTO: 8.31 K/UL — SIGNIFICANT CHANGE UP (ref 4.8–10.8)

## 2017-01-16 PROCEDURE — 85730 THROMBOPLASTIN TIME PARTIAL: CPT

## 2017-01-16 PROCEDURE — 99239 HOSP IP/OBS DSCHRG MGMT >30: CPT

## 2017-01-16 PROCEDURE — 80048 BASIC METABOLIC PNL TOTAL CA: CPT

## 2017-01-16 PROCEDURE — 85610 PROTHROMBIN TIME: CPT

## 2017-01-16 PROCEDURE — 83735 ASSAY OF MAGNESIUM: CPT

## 2017-01-16 PROCEDURE — 83605 ASSAY OF LACTIC ACID: CPT

## 2017-01-16 PROCEDURE — 93971 EXTREMITY STUDY: CPT

## 2017-01-16 PROCEDURE — 99285 EMERGENCY DEPT VISIT HI MDM: CPT | Mod: 25

## 2017-01-16 PROCEDURE — 71046 X-RAY EXAM CHEST 2 VIEWS: CPT

## 2017-01-16 PROCEDURE — 36415 COLL VENOUS BLD VENIPUNCTURE: CPT

## 2017-01-16 PROCEDURE — 99261: CPT

## 2017-01-16 PROCEDURE — 84145 PROCALCITONIN (PCT): CPT

## 2017-01-16 PROCEDURE — 80053 COMPREHEN METABOLIC PANEL: CPT

## 2017-01-16 PROCEDURE — 84443 ASSAY THYROID STIM HORMONE: CPT

## 2017-01-16 PROCEDURE — 84484 ASSAY OF TROPONIN QUANT: CPT

## 2017-01-16 PROCEDURE — 94640 AIRWAY INHALATION TREATMENT: CPT

## 2017-01-16 PROCEDURE — 84100 ASSAY OF PHOSPHORUS: CPT

## 2017-01-16 PROCEDURE — 85027 COMPLETE CBC AUTOMATED: CPT

## 2017-01-16 PROCEDURE — 93005 ELECTROCARDIOGRAM TRACING: CPT

## 2017-01-16 PROCEDURE — 87040 BLOOD CULTURE FOR BACTERIA: CPT

## 2017-01-16 RX ORDER — MIDODRINE HYDROCHLORIDE 2.5 MG/1
1 TABLET ORAL
Qty: 90 | Refills: 0 | OUTPATIENT
Start: 2017-01-16 | End: 2017-02-15

## 2017-01-16 RX ORDER — CEPHALEXIN 500 MG
1 CAPSULE ORAL
Qty: 20 | Refills: 0 | OUTPATIENT
Start: 2017-01-16 | End: 2017-01-21

## 2017-01-16 RX ORDER — NIFEDIPINE 30 MG
1 TABLET, EXTENDED RELEASE 24 HR ORAL
Qty: 30 | Refills: 0 | OUTPATIENT
Start: 2017-01-16 | End: 2017-02-15

## 2017-01-16 RX ORDER — METOPROLOL TARTRATE 50 MG
1 TABLET ORAL
Qty: 60 | Refills: 0 | OUTPATIENT
Start: 2017-01-16 | End: 2017-02-15

## 2017-01-16 RX ADMIN — CLOPIDOGREL BISULFATE 75 MILLIGRAM(S): 75 TABLET, FILM COATED ORAL at 12:54

## 2017-01-16 RX ADMIN — BUDESONIDE AND FORMOTEROL FUMARATE DIHYDRATE 1 PUFF(S): 160; 4.5 AEROSOL RESPIRATORY (INHALATION) at 09:29

## 2017-01-16 RX ADMIN — HEPARIN SODIUM 5000 UNIT(S): 5000 INJECTION INTRAVENOUS; SUBCUTANEOUS at 12:54

## 2017-01-16 RX ADMIN — OXYCODONE HYDROCHLORIDE 5 MILLIGRAM(S): 5 TABLET ORAL at 06:00

## 2017-01-16 RX ADMIN — Medication 50 MILLIGRAM(S): at 12:54

## 2017-01-16 RX ADMIN — Medication 81 MILLIGRAM(S): at 12:54

## 2017-01-16 RX ADMIN — MIDODRINE HYDROCHLORIDE 5 MILLIGRAM(S): 2.5 TABLET ORAL at 05:56

## 2017-01-16 RX ADMIN — Medication 60 MILLIGRAM(S): at 05:57

## 2017-01-16 RX ADMIN — HEPARIN SODIUM 5000 UNIT(S): 5000 INJECTION INTRAVENOUS; SUBCUTANEOUS at 05:55

## 2017-01-16 RX ADMIN — Medication 50 MILLIGRAM(S): at 05:57

## 2017-01-16 RX ADMIN — OXYCODONE HYDROCHLORIDE 5 MILLIGRAM(S): 5 TABLET ORAL at 06:57

## 2017-01-16 RX ADMIN — Medication 1 MILLIGRAM(S): at 12:54

## 2017-01-16 RX ADMIN — RALTEGRAVIR 400 MILLIGRAM(S): 400 TABLET, FILM COATED ORAL at 05:56

## 2017-01-16 RX ADMIN — MIDODRINE HYDROCHLORIDE 5 MILLIGRAM(S): 2.5 TABLET ORAL at 12:54

## 2017-01-16 NOTE — DISCHARGE NOTE ADULT - CARE PLAN
Principal Discharge DX:	Hypotension  Goal:	resolved  Instructions for follow-up, activity and diet:	Doses of procardia & lopressor reduced. Pls take the meds as directed. Midodrine added by renal. ILR without any arrhythmias. Minimal troponin elevation likely secondary to ESRD and CAD. Pls follow up with PMD, cardio & nephrology as outpatient  Secondary Diagnosis:	Cellulitis  Instructions for follow-up, activity and diet:	Improved. Continue meds as directed. Follow up with PMD  Secondary Diagnosis:	Afib  Instructions for follow-up, activity and diet:	Doses of procardia & lopressor reduced. Pls take the meds as directed. Pls follow up with PMD &  cardiology  Secondary Diagnosis:	Asthma  Instructions for follow-up, activity and diet:	Continue home meds as directed. Follow up with PMD  Secondary Diagnosis:	ESRD (end stage renal disease)  Instructions for follow-up, activity and diet:	Continue home meds as directed. Follow up with PMD & nephrology  Secondary Diagnosis:	HIV disease  Instructions for follow-up, activity and diet:	Continue home meds as directed. Follow up with PMD & ID

## 2017-01-16 NOTE — DISCHARGE NOTE ADULT - PLAN OF CARE
resolved Doses of procardia & lopressor reduced. Pls take the meds as directed. Midodrine added by renal. ILR without any arrhythmias. Minimal troponin elevation likely secondary to ESRD and CAD. Pls follow up with PMD, cardio & nephrology as outpatient Improved. Continue meds as directed. Follow up with PMD Doses of procardia & lopressor reduced. Pls take the meds as directed. Pls follow up with PMD &  cardiology Continue home meds as directed. Follow up with PMD Continue home meds as directed. Follow up with PMD & nephrology Continue home meds as directed. Follow up with PMD & ID

## 2017-01-16 NOTE — PROGRESS NOTE ADULT - SUBJECTIVE AND OBJECTIVE BOX
[place] [facility]                                   [address] [place] [address]                                                      PHONE: [phone]                                                         FAX: [phone]  [email or web address]  ---------------------------------------------------------------------------------------------------------------------------------    Pt seen and examined. FU for elevated troponin    Overnight events/patient complaints: Pt without complains. Sent in for low BP. Pt has been ambulating. Was due ti start home PT.    Vital Signs Last 24 Hrs  T(C): 36.4, Max: 36.7 (01-15 @ 10:12)  T(F): 97.6, Max: 98.1 (01-15 @ 10:12)  HR: 81 (81 - 99)  BP: 139/84 (103/74 - 139/84)  BP(mean): --  RR: 18 (17 - 18)  SpO2: 97% (97% - 98%)  I&O's Summary      PHYSICAL EXAM:  Constitutional: Oriented to time, place and person. Appears well developed, well nourished; alert and co-operative  HEENT:     Conjunctiva normal, Normal oral mucosa, No JVD	  Cardiovascular: Normal S1 S2, No murmurs  Respiratory: Lungs clear to auscultation; no crepitations, no wheeze  Gastrointestinal:  Soft, Non-tender, + BS	  Extremities: No cyanosis, clubbing or edema  Skin: Warm and dry  Neurologic: Alert oriented to time place and person  Psychiatric: affect was normal        LABS:                        12.2   8.31  )-----------( 318      ( 16 Jan 2017 07:35 )             38.1     16 Jan 2017 07:35    142    |  96     |  37.0   ----------------------------<  111    4.5     |  26.0   |  5.45     Ca    9.5        16 Jan 2017 07:35  Phos  5.5       16 Jan 2017 07:35  Mg     2.7       16 Jan 2017 07:35      CARDIAC MARKERS ( 16 Jan 2017 07:35 )  x     / 0.83 ng/mL / x     / x     / x      CARDIAC MARKERS ( 15 Jacobo 2017 18:43 )  x     / 0.71 ng/mL / x     / x     / x      CARDIAC MARKERS ( 15 Jacobo 2017 16:28 )  x     / 0.72 ng/mL / x     / x     / x        CARDIOLOGY TESTING:     ECG: AF with IVCD, PRWP, LAD    Echocardiogram: LVEF 50-55%; ,mild-moderate MR, mild-moderate TR, mild-moderate AS    Stress test: Small fixed defect in the mid-basal inferolateral wall suggestive of previous myocardial infarction. No clear evidence of ischemia.    RADIOLOGY & ADDITIONAL STUDIES:    MEDICATIONS:  MEDICATIONS  (STANDING):  midodrine 5milliGRAM(s) Oral three times a day  aspirin enteric coated 81milliGRAM(s) Oral daily  atorvastatin 10milliGRAM(s) Oral at bedtime  clopidogrel Tablet 75milliGRAM(s) Oral daily  buDESOnide 160 MICROgram(s)/formoterol 4.5 MICROgram(s) Inhaler 1Puff(s) Inhalation two times a day  folic acid 1milliGRAM(s) Oral daily  ampicillin/sulbactam  IVPB 3Gram(s) IV Intermittent every 24 hours  heparin  Injectable 5000Unit(s) SubCutaneous every 8 hours  tenofovir 300milliGRAM(s) Oral every 7 days  metoprolol 50milliGRAM(s) Oral two times a day  NIFEdipine XL 60milliGRAM(s) Oral daily  raltegravir Tablet 400milliGRAM(s) Oral two times a day  lamiVUDine-HBV 50milliGRAM(s) Oral daily    MEDICATIONS  (PRN):  oxyCODONE IR 5milliGRAM(s) Oral every 4 hours PRN Moderate Pain (4 - 6) [place] [facility]                                   [address] [place] [address]                                                      PHONE: [phone]                                                         FAX: [phone]  [email or web address]  ---------------------------------------------------------------------------------------------------------------------------------    Pt seen and examined. FU for elevated troponin    Overnight events/patient complaints: Pt without complains. Sent in for low BP. Pt has been ambulating. Was due to start home PT.    Vital Signs Last 24 Hrs  T(C): 36.4, Max: 36.7 (01-15 @ 10:12)  T(F): 97.6, Max: 98.1 (01-15 @ 10:12)  HR: 81 (81 - 99)  BP: 139/84 (103/74 - 139/84)  BP(mean): --  RR: 18 (17 - 18)  SpO2: 97% (97% - 98%)  I&O's Summary      PHYSICAL EXAM:  Constitutional: Oriented to time, place and person. Appears well developed, well nourished; alert and co-operative  HEENT:     Conjunctiva normal, Normal oral mucosa, No JVD	  Cardiovascular: Normal S1 S2, No murmurs  Respiratory: Lungs clear to auscultation; no crepitations, no wheeze  Gastrointestinal:  Soft, Non-tender, + BS	  Extremities: No cyanosis, clubbing or edema  Skin: Warm and dry  Neurologic: Alert oriented to time place and person  Psychiatric: affect was normal        LABS:                        12.2   8.31  )-----------( 318      ( 16 Jan 2017 07:35 )             38.1     16 Jan 2017 07:35    142    |  96     |  37.0   ----------------------------<  111    4.5     |  26.0   |  5.45     Ca    9.5        16 Jan 2017 07:35  Phos  5.5       16 Jan 2017 07:35  Mg     2.7       16 Jan 2017 07:35      CARDIAC MARKERS ( 16 Jan 2017 07:35 )  x     / 0.83 ng/mL / x     / x     / x      CARDIAC MARKERS ( 15 Jacobo 2017 18:43 )  x     / 0.71 ng/mL / x     / x     / x      CARDIAC MARKERS ( 15 Jacobo 2017 16:28 )  x     / 0.72 ng/mL / x     / x     / x        CARDIOLOGY TESTING:     ECG: AF with IVCD, PRWP, LAD    Echocardiogram: LVEF 50-55%; ,mild-moderate MR, mild-moderate TR, mild-moderate AS    Stress test: Small fixed defect in the mid-basal inferolateral wall suggestive of previous myocardial infarction. No clear evidence of ischemia.    RADIOLOGY & ADDITIONAL STUDIES:    MEDICATIONS:  MEDICATIONS  (STANDING):  midodrine 5milliGRAM(s) Oral three times a day  aspirin enteric coated 81milliGRAM(s) Oral daily  atorvastatin 10milliGRAM(s) Oral at bedtime  clopidogrel Tablet 75milliGRAM(s) Oral daily  buDESOnide 160 MICROgram(s)/formoterol 4.5 MICROgram(s) Inhaler 1Puff(s) Inhalation two times a day  folic acid 1milliGRAM(s) Oral daily  ampicillin/sulbactam  IVPB 3Gram(s) IV Intermittent every 24 hours  heparin  Injectable 5000Unit(s) SubCutaneous every 8 hours  tenofovir 300milliGRAM(s) Oral every 7 days  metoprolol 50milliGRAM(s) Oral two times a day  NIFEdipine XL 60milliGRAM(s) Oral daily  raltegravir Tablet 400milliGRAM(s) Oral two times a day  lamiVUDine-HBV 50milliGRAM(s) Oral daily    MEDICATIONS  (PRN):  oxyCODONE IR 5milliGRAM(s) Oral every 4 hours PRN Moderate Pain (4 - 6)

## 2017-01-16 NOTE — DISCHARGE NOTE ADULT - HOSPITAL COURSE
Patient is a 56y old  Male who presents with a chief complaint of Recurrent Symptomatic Hypotension. Pt is usually hypertensive as per daughter at bedside, gets BB & CCB but has been running low since 1 week. Pt been on chronic opiates. Recently admitted for Syncope w/in the week . ECHO showed LVEF 40-45%, mod AS, mild MVR, No effusion . Carotid doppler showed R 50-69% occlusion . Serial CE (-). CT head w/o acute changes . Loop recorder placed and was to follow up with cardiology . EEG done showed no seizure activity. Hgb was stable ( 14) . Sent to ER from HD today due to low BP ( < 90 ) . Otherwise, feels relatively fine w/o sig . complaints . Denies any fever, chills, cough, diarrhea. Pt had a recent trauma & appears to have RLE cellulitis. Pt's opiates, BB & CCB held. HD done with midodrine. Pt's BP improved. Pt given IV abx for cellulitits with Improvement in cellulitis.  Blood cx NGTD. Cardio was called in due to  Asymptomatic NSVT, CMP, with EKG changes & mild TnI.  elevation- No arrhythmias on tele or ILR. No fluid overload. Minimal troponin elevation likely secondary to ESRD and CAD. Recent PET without ischemia. no further inpt cardiac work-up needed. Pt cleared by cardio for d/c home. Pt doing well. VSS. Agreeable to plan. Area of cellulitis healing well. Discussed with pt & daughter at length

## 2017-01-16 NOTE — PROGRESS NOTE ADULT - SUBJECTIVE AND OBJECTIVE BOX
NEPHROLOGY INTERVAL HPI/OVERNIGHT EVENTS:  Feels well   no new complaints   For possible d/c   Cardio follopw up noted   MEDICATIONS  (STANDING):  midodrine 5milliGRAM(s) Oral three times a day  aspirin enteric coated 81milliGRAM(s) Oral daily  atorvastatin 10milliGRAM(s) Oral at bedtime  clopidogrel Tablet 75milliGRAM(s) Oral daily  buDESOnide 160 MICROgram(s)/formoterol 4.5 MICROgram(s) Inhaler 1Puff(s) Inhalation two times a day  folic acid 1milliGRAM(s) Oral daily  ampicillin/sulbactam  IVPB 3Gram(s) IV Intermittent every 24 hours  heparin  Injectable 5000Unit(s) SubCutaneous every 8 hours  tenofovir 300milliGRAM(s) Oral every 7 days  metoprolol 50milliGRAM(s) Oral two times a day  NIFEdipine XL 60milliGRAM(s) Oral daily  raltegravir Tablet 400milliGRAM(s) Oral two times a day  lamiVUDine-HBV 50milliGRAM(s) Oral daily    MEDICATIONS  (PRN):  oxyCODONE IR 5milliGRAM(s) Oral every 4 hours PRN Moderate Pain (4 - 6)      Allergies    No Known Drug Allergies  shellfish (Hives)    Intolerances              Vital Signs Last 24 Hrs  T(C): 36.4, Max: 36.6 (01-16 @ 00:00)  T(F): 97.6, Max: 97.9 (01-16 @ 00:00)  HR: 81 (81 - 99)  BP: 139/84 (103/74 - 139/84)  BP(mean): --  RR: 18 (17 - 18)  SpO2: 97% (97% - 97%)  Daily     Daily   I&O's Detail    I&O's Summary      PHYSICAL EXAM:    GENERAL: NAD, well-groomed, well-developed  HEAD:  Atraumatic, Normocephalic  EYES: EOMI, PERRLA, conjunctiva and sclera clear  ENMT: No tonsillar erythema, exudates, or enlargement; Moist mucous membranes, Good dentition, No lesions  NECK: Supple, No JVD, Normal thyroid  NERVOUS SYSTEM:  Alert & Oriented X3, Good concentration; Motor Strength 5/5 B/L upper and lower extremities; DTRs 2+ intact and symmetric  CHEST/LUNG: Clear to percussion bilaterally; No rales, rhonchi, wheezing, or rubs  HEART: Regular rate and rhythm; No murmurs, rubs, or gallops  ABDOMEN: Soft, Nontender, Nondistended; Bowel sounds present  EXTREMITIES:  2+ Peripheral Pulses, No clubbing, cyanosis, or edema  KIN: No rashes or lesions    LABS:                        12.2   8.31  )-----------( 318      ( 16 Jan 2017 07:35 )             38.1     16 Jan 2017 07:35    142    |  96     |  37.0   ----------------------------<  111    4.5     |  26.0   |  5.45     Ca    9.5        16 Jan 2017 07:35  Phos  5.5       16 Jan 2017 07:35  Mg     2.7       16 Jan 2017 07:35          Magnesium, Serum: 2.7 mg/dL (01-16 @ 07:35)  Phosphorus Level, Serum: 5.5 mg/dL (01-16 @ 07:35)          RADIOLOGY & ADDITIONAL TESTS:

## 2017-01-16 NOTE — DISCHARGE NOTE ADULT - MEDICATION SUMMARY - MEDICATIONS TO TAKE
I will START or STAY ON the medications listed below when I get home from the hospital:    aspirin 81 mg oral delayed release tablet  -- 1 tab(s) by mouth once a day  -- last dose 2/20/15  -- Indication: For Cad    oxyCODONE 5 mg oral tablet  -- 1-2  tab(s) by mouth every 4-6  hours, As Needed, Moderate to severe Pain MDD:6 tablets  -- Indication: For pain    Lipitor 10 mg oral tablet  -- 1 tab(s) by mouth once a day (at bedtime)  -- Indication: For Hld    Plavix 75 mg oral tablet  -- 1 tab(s) by mouth once a day  -- last dose 2/20/15  -- Indication: For Cad    lamiVUDine 100 mg oral tablet  -- 50 milligram(s) by mouth once a day  -- last picked up OCT for 90 day supply  -- Indication: For Hlv    Viread 300 mg oral tablet  -- 300 milligram(s) by mouth every 7 days  -- last picked up October for 90 day supply  -- Indication: For Hlv    raltegravir 400 mg oral tablet  -- 400 milligram(s) by mouth 2 times a day  -- last picked up 12/17/16  -- Indication: For Hiv    Lopressor 50 mg oral tablet  -- 1 tab(s) by mouth 2 times a day  -- It is very important that you take or use this exactly as directed.  Do not skip doses or discontinue unless directed by your doctor.  May cause drowsiness.  Alcohol may intensify this effect.  Use care when operating dangerous machinery.  Some non-prescription drugs may aggravate your condition.  Read all labels carefully.  If a warning appears, check with your doctor before taking.  Take with food or milk.  This drug may impair the ability to drive or operate machinery.  Use care until you become familiar with its effects.    -- Indication: For AF    albuterol 0.63 mg/3 mL (0.021%) inhalation solution  -- 3 milliliter(s) inhaled every 4- 6 hours Diapense 1 Box  -- For inhalation only.  It is very important that you take or use this exactly as directed.  Do not skip doses or discontinue unless directed by your doctor.  Obtain medical advice before taking any non-prescription drugs as some may affect the action of this medication.    -- Indication: For sob    Symbicort 160 mcg-4.5 mcg/inh inhalation aerosol  -- 2 puff(s) inhaled 2 times a day  -- Indication: For sob    Procardia XL 60 mg oral tablet, extended release  -- 1 tab(s) by mouth once a day  -- Avoid grapefruit and grapefruit juice while taking this medication.  It is very important that you take or use this exactly as directed.  Do not skip doses or discontinue unless directed by your doctor.  Some non-prescription drugs may aggravate your condition.  Read all labels carefully.  If a warning appears, check with your doctor before taking.  Swallow whole.  Do not crush.    -- Indication: For AF    Keflex 500 mg oral capsule  -- 1 cap(s) by mouth 4 times a day  -- Finish all this medication unless otherwise directed by prescriber.    -- Indication: For Cellulitis    MiraLax - oral powder for reconstitution  -- 17 gram(s) by mouth once a day MDD:17 grams for constipation  -- Dilute this medication with liquid before administration.  It is very important that you take or use this exactly as directed.  Do not skip doses or discontinue unless directed by your doctor.    -- Indication: For Contipation    docusate sodium 100 mg oral capsule  -- 1 cap(s) by mouth 3 times a day prn constipation  -- Indication: For Contipation    midodrine 5 mg oral tablet  -- 1 tab(s) by mouth 3 times a day  -- Indication: For Hypotension    Renvela carbonate 800 mg oral tablet  -- 1 tab(s) by mouth 3 times a day  -- Indication: For Ckd    folic acid 1 mg oral tablet  -- 1 tab(s) by mouth once a day  -- Indication: For nutrition

## 2017-01-16 NOTE — DISCHARGE NOTE ADULT - CARE PROVIDER_API CALL
Samuel Gordon (MBBS; MPH), Internal Medicine  [address] [place] [address]  Phone: [phone]  Fax: [phone]    Jim Briggs (DO), Nephrology  [address] [place] [address]  Phone: [phone]  Fax: [phone]

## 2017-01-16 NOTE — DISCHARGE NOTE ADULT - PATIENT PORTAL LINK FT
“You can access the [facility] Patient Portal, offered by , by registering with the following website: [email or web address]”

## 2017-01-16 NOTE — DISCHARGE NOTE ADULT - MEDICATION SUMMARY - MEDICATIONS TO CHANGE
I will SWITCH the dose or number of times a day I take the medications listed below when I get home from the hospital:    Procardia XL 90 mg oral tablet, extended release  -- 1 tab(s) by mouth once a day    Lopressor 100 mg oral tablet  -- 1 tab(s) by mouth 2 times a day

## 2017-01-16 NOTE — PROGRESS NOTE ADULT - ASSESSMENT
56y Male with prior history of CAD with PCI in 2012, old MI in 2012, ESRD on HD, AF not on anticoagulation, HTN s/p multiple falls, recent admission for syncope- now sent from HD for low BP.    1. Will check ILR to assess for bradyarrhythmias  2. Metoprolol decreased and midodrine added for low BP.  3. No arrhythmias on tele so far.  4. No fluid overload  5. Minimal troponin elevation likely secondary to ESRD and CAD. Recent PET without ischemia.  6. If ILR without events, no further inpt cardiac work-up needed.

## 2017-01-16 NOTE — PROGRESS NOTE ADULT - ASSESSMENT
Problem/Recommendation - 1:  Problem: ESRD (end stage renal disease). Recommendation: Next HD Tuesday  No sig . fluid overload       Problem/Recommendation - 2:  ·  Problem: Anemia, chronic disease.  Recommendation: Last Hgb > 11, hold epogen.     Problem/Recommendation - 3:  ·  Problem: Hypotension, unspecified hypotension type.  Recommendation: No clinical evidence of sepsis   Recent ECHO, Carotids, Head CT , CXR, events noted   + HIV   Added midodrine 5 mg TID   Recent loop recorder planted ( [employer] ).   cardiology follow up noted   Blood cx negative

## 2017-01-19 ENCOUNTER — EMERGENCY (EMERGENCY)
Facility: HOSPITAL | Age: 57
LOS: 1 days | End: 2017-01-19
Attending: EMERGENCY MEDICINE
Payer: MEDICARE

## 2017-01-19 VITALS — WEIGHT: 184.97 LBS | HEIGHT: 70 IN | TEMPERATURE: 98 F

## 2017-01-19 DIAGNOSIS — Z79.02 LONG TERM (CURRENT) USE OF ANTITHROMBOTICS/ANTIPLATELETS: ICD-10-CM

## 2017-01-19 DIAGNOSIS — Z98.890 OTHER SPECIFIED POSTPROCEDURAL STATES: ICD-10-CM

## 2017-01-19 DIAGNOSIS — I46.9 CARDIAC ARREST, CAUSE UNSPECIFIED: ICD-10-CM

## 2017-01-19 DIAGNOSIS — Z95.5 PRESENCE OF CORONARY ANGIOPLASTY IMPLANT AND GRAFT: ICD-10-CM

## 2017-01-19 DIAGNOSIS — E78.5 HYPERLIPIDEMIA, UNSPECIFIED: ICD-10-CM

## 2017-01-19 DIAGNOSIS — I10 ESSENTIAL (PRIMARY) HYPERTENSION: ICD-10-CM

## 2017-01-19 DIAGNOSIS — Z91.013 ALLERGY TO SEAFOOD: ICD-10-CM

## 2017-01-19 DIAGNOSIS — Z79.82 LONG TERM (CURRENT) USE OF ASPIRIN: ICD-10-CM

## 2017-01-19 DIAGNOSIS — I25.10 ATHEROSCLEROTIC HEART DISEASE OF NATIVE CORONARY ARTERY WITHOUT ANGINA PECTORIS: ICD-10-CM

## 2017-01-19 DIAGNOSIS — J44.9 CHRONIC OBSTRUCTIVE PULMONARY DISEASE, UNSPECIFIED: ICD-10-CM

## 2017-01-19 LAB
CULTURE RESULTS: SIGNIFICANT CHANGE UP
CULTURE RESULTS: SIGNIFICANT CHANGE UP
SPECIMEN SOURCE: SIGNIFICANT CHANGE UP
SPECIMEN SOURCE: SIGNIFICANT CHANGE UP

## 2017-01-19 PROCEDURE — 92950 HEART/LUNG RESUSCITATION CPR: CPT

## 2017-01-19 PROCEDURE — 99285 EMERGENCY DEPT VISIT HI MDM: CPT | Mod: 25

## 2017-01-19 RX ORDER — EPINEPHRINE 0.3 MG/.3ML
1 INJECTION INTRAMUSCULAR; SUBCUTANEOUS ONCE
Qty: 0 | Refills: 0 | Status: DISCONTINUED | OUTPATIENT
Start: 2017-01-19 | End: 2017-01-23

## 2017-01-19 RX ORDER — SODIUM BICARBONATE 1 MEQ/ML
50 SYRINGE (ML) INTRAVENOUS ONCE
Qty: 0 | Refills: 0 | Status: DISCONTINUED | OUTPATIENT
Start: 2017-01-19 | End: 2017-01-23

## 2017-01-19 NOTE — DISCHARGE NOTE FOR THE EXPIRED PATIENT - HOSPITAL COURSE
patient brought in by ambulance in cardiac arrest CPR in progress continued in ambulance triage Dr Bautista pronounced at 1543. ME's office notified awaiting call ball. Dr Bautista notified Dr Familia Barbosa will sign death certificate. [place] [facility] [place] notified spoke with Bebeto this is not a case for organ donation due to history. Brian removed family at bedside. [facility] autopsy offered spouse and family declined. post mortem care sparkle Hernandez RN and FARIDA VILCHIS  brought down to [facility] by Cara VILCHIS

## 2017-01-19 NOTE — ED PROVIDER NOTE - MUSCULOSKELETAL, MLM
Spine appears normal, range of motion is not limited, no muscle or joint tenderness Spine appears normal, no lesions on back

## 2017-01-19 NOTE — ED PROVIDER NOTE - PSYCHIATRIC, MLM
Alert and oriented to person, place, time/situation. normal mood and affect. no apparent risk to self or others. unable

## 2017-01-19 NOTE — ED PROVIDER NOTE - COMMENTS
Dr Barbosa pmd contacted and he states he will sign death certificate this was relayed to ME office Naun who will call wife and call back

## 2017-01-19 NOTE — ED PROVIDER NOTE - OBJECTIVE STATEMENT
55 y/o male with a h/o esrd on dialysis biba who report pt witnessed cardiac arrest at home and they got to scene within a few minutes and pt was in asystole no bystander cpr and they began resusitation and treated pt with cpr 6 epi iv and intubation with a maritza tube pt remained in asystole on arrival cpr continued 57 y/o male with a h/o esrd on dialysis biba who report pt witnessed cardiac arrest at home and they got to scene within a few minutes and pt was in asystole no bystander cpr and they began resuscitation and treated pt with cpr 6 epi iv and intubation with a maritza tube pt remained in asystole on arrival cpr continued

## 2017-01-19 NOTE — ED ADULT TRIAGE NOTE - CHIEF COMPLAINT QUOTE
BIBA< patient arrives to Ed in cardiac arrest, as per ems, patient was a witnessed cardiac arrest, 911 call at 1454, patient was found in asystole by ALS provider, ALEC LANGSTON, patient was given 6 epi's, CPR started by ems 1455, downtime approximately 35 minutes, patients daughter is present on arrival

## 2017-01-19 NOTE — ED PROVIDER NOTE - MEDICAL DECISION MAKING DETAILS
cardiac arrest systole 40 min prior to arrival resus attempted see code flow sheet but patient failed to respond and was pronounced dead by me at 1542

## 2017-01-19 NOTE — ED PROVIDER NOTE - NEUROLOGICAL, MLM
Alert and oriented, no focal deficits, no motor or sensory deficits. unresponsive with flaccid extremities

## 2017-01-20 ENCOUNTER — APPOINTMENT (OUTPATIENT)
Dept: ORTHOPEDIC SURGERY | Facility: CLINIC | Age: 57
End: 2017-01-20

## 2017-01-20 DIAGNOSIS — M47.816 SPONDYLOSIS W/OUT MYELOPATHY OR RADICULOPATHY, LUMBAR REGION: ICD-10-CM

## 2017-01-30 ENCOUNTER — APPOINTMENT (OUTPATIENT)
Dept: INTERNAL MEDICINE | Facility: CLINIC | Age: 57
End: 2017-01-30

## 2020-09-05 NOTE — ED ADULT TRIAGE NOTE - PAIN RATING/NUMBER SCALE (0-10): REST
IV removed. Pt given discharge instructions including follow-up information. Prescriptions were electronically sent to pt's preferred pharmacy by ER MD. All questions answered. Pt verbalized understanding. Wheelchair offered. Pt ambulated with a steady gait to waiting area using walker alongside pt's daughter. Pt's daughter to drive patient home.  
Pt assisted to void using bedpan. Pt comfortably on cart in no apparent distress, respirations regular. Adjusted for comfort. Family member bedside. Call light within reach. Will continue to monitor.  
Pt resting comfortably on cart in no apparent distress, respirations regular. Family member bedside. Updated on plan of care. Call light within reach. Will continue to monitor.  
5

## 2022-05-26 NOTE — PROGRESS NOTE ADULT - ASSESSMENT
ESRD on HD on a TTS schedule   Has been tolerating ok next HD today  Consent obtained.  admitted for syncope workup (1) obesity (BMI greater than 25)

## 2022-09-04 NOTE — DISCHARGE NOTE ADULT - MEDICATION SUMMARY - MEDICATIONS TO STOP TAKING
I will STOP taking the medications listed below when I get home from the hospital:    Norco 5 mg-325 mg oral tablet  -- 1 tab(s) by mouth 2 times a day MDD:2  -- Caution federal law prohibits the transfer of this drug to any person other  than the person for whom it was prescribed.  May cause drowsiness.  Alcohol may intensify this effect.  Use care when operating dangerous machinery.  This product contains acetaminophen.  Do not use  with any other product containing acetaminophen to prevent possible liver damage.  Using more of this medication than prescribed may cause serious breathing problems.    predniSONE 20 mg oral tablet  -- 2 tab(s) by mouth once a day  -- It is very important that you take or use this exactly as directed.  Do not skip doses or discontinue unless directed by your doctor.  Obtain medical advice before taking any non-prescription drugs as some may affect the action of this medication.  Take with food or milk.
Clear

## 2022-10-19 NOTE — ED PROVIDER NOTE - CRTICAL CARE TIME SPENT (MIN)
Cyclophosphamide Pregnancy And Lactation Text: This medication is Pregnancy Category D and it isn't considered safe during pregnancy. This medication is excreted in breast milk. 35

## 2024-07-12 NOTE — H&P ADULT. - EKG AND INTERPRETATION
Modify Regimen: spironolactone 50 mg tablet \\nTake one tablet once daily with food and water.
Detail Level: Zone
visualized: A.fib, LAD, RBBB